# Patient Record
Sex: MALE | Race: WHITE | NOT HISPANIC OR LATINO | Employment: FULL TIME | URBAN - NONMETROPOLITAN AREA
[De-identification: names, ages, dates, MRNs, and addresses within clinical notes are randomized per-mention and may not be internally consistent; named-entity substitution may affect disease eponyms.]

---

## 2017-06-08 ENCOUNTER — AMBULATORY - GICH (OUTPATIENT)
Dept: LAB | Facility: OTHER | Age: 37
End: 2017-06-08

## 2017-06-08 DIAGNOSIS — Z51.81 ENCOUNTER FOR THERAPEUTIC DRUG LEVEL MONITORING: ICD-10-CM

## 2017-06-08 LAB
A/G RATIO - HISTORICAL: 1.7 (ref 1–2)
ALBUMIN SERPL-MCNC: 4.5 G/DL (ref 3.5–5.7)
ALP SERPL-CCNC: 64 IU/L (ref 34–104)
ALT (SGPT) - HISTORICAL: 38 IU/L (ref 7–52)
AST SERPL-CCNC: 25 IU/L (ref 13–39)
BILIRUB SERPL-MCNC: 0.4 MG/DL (ref 0.3–1)
BILIRUBIN, DIRECT: 0.04 MG/DL (ref 0.03–0.18)
BILIRUBIN,INDIRECT: 0.4 MG/DL (ref 0.2–0.8)
ERYTHROCYTE [DISTWIDTH] IN BLOOD BY AUTOMATED COUNT: 12.3 % (ref 11.5–15.5)
GLOBULIN - HISTORICAL: 2.6 G/DL (ref 2–3.7)
HCT VFR BLD AUTO: 43.5 % (ref 37–53)
HEMOGLOBIN: 16.3 G/DL (ref 13.5–17.5)
MCH RBC QN AUTO: 33.7 PG (ref 26–34)
MCHC RBC AUTO-ENTMCNC: 37.5 G/DL (ref 32–36)
MCV RBC AUTO: 90 FL (ref 80–100)
PLATELET # BLD AUTO: 240 THOU/CU MM (ref 140–440)
PMV BLD: 10.3 FL (ref 6.5–11)
PROT SERPL-MCNC: 7.1 G/DL (ref 6.4–8.9)
RED BLOOD COUNT - HISTORICAL: 4.83 MIL/CU MM (ref 4.3–5.9)
WHITE BLOOD COUNT - HISTORICAL: 8.7 THOU/CU MM (ref 4.5–11)

## 2017-07-07 ENCOUNTER — AMBULATORY - GICH (OUTPATIENT)
Dept: LAB | Facility: OTHER | Age: 37
End: 2017-07-07

## 2017-07-07 DIAGNOSIS — Z51.81 ENCOUNTER FOR THERAPEUTIC DRUG LEVEL MONITORING: ICD-10-CM

## 2017-07-07 LAB
A/G RATIO - HISTORICAL: 2 (ref 1–2)
ALBUMIN SERPL-MCNC: 4.6 G/DL (ref 3.5–5.7)
ALP SERPL-CCNC: 66 IU/L (ref 34–104)
ALT (SGPT) - HISTORICAL: 40 IU/L (ref 7–52)
AST SERPL-CCNC: 25 IU/L (ref 13–39)
BILIRUB SERPL-MCNC: 0.4 MG/DL (ref 0.3–1)
BILIRUBIN, DIRECT: 0.09 MG/DL (ref 0.03–0.18)
BILIRUBIN,INDIRECT: 0.3 MG/DL (ref 0.2–0.8)
ERYTHROCYTE [DISTWIDTH] IN BLOOD BY AUTOMATED COUNT: 12.7 % (ref 11.5–15.5)
GLOBULIN - HISTORICAL: 2.3 G/DL (ref 2–3.7)
HCT VFR BLD AUTO: 38.8 % (ref 37–53)
HEMOGLOBIN: 14 G/DL (ref 13.5–17.5)
MCH RBC QN AUTO: 32.5 PG (ref 26–34)
MCHC RBC AUTO-ENTMCNC: 36.1 G/DL (ref 32–36)
MCV RBC AUTO: 90 FL (ref 80–100)
PLATELET # BLD AUTO: 207 THOU/CU MM (ref 140–440)
PMV BLD: 10.7 FL (ref 6.5–11)
PROT SERPL-MCNC: 6.9 G/DL (ref 6.4–8.9)
RED BLOOD COUNT - HISTORICAL: 4.31 MIL/CU MM (ref 4.3–5.9)
WHITE BLOOD COUNT - HISTORICAL: 7.9 THOU/CU MM (ref 4.5–11)

## 2017-11-24 ENCOUNTER — AMBULATORY - GICH (OUTPATIENT)
Dept: RADIOLOGY | Facility: OTHER | Age: 37
End: 2017-11-24

## 2017-11-24 DIAGNOSIS — M79.672 PAIN OF LEFT FOOT: ICD-10-CM

## 2017-11-29 ENCOUNTER — HOSPITAL ENCOUNTER (OUTPATIENT)
Dept: RADIOLOGY | Facility: OTHER | Age: 37
End: 2017-11-29

## 2017-11-29 DIAGNOSIS — M79.672 PAIN OF LEFT FOOT: ICD-10-CM

## 2017-12-28 NOTE — PROGRESS NOTES
Patient Information     Patient Name MRN Sex Ulises Doll [id number] Male 1980      Progress Notes by Annalisa Murphy at 2017  6:07 PM     Author:  Annalisa Murphy Service:  (none) Author Type:  Other Clinical Staff     Filed:  2017  6:07 PM Date of Service:  2017  6:07 PM Status:  Signed     :  Annalisa Murphy (Other Clinical Staff)            Falls Risk Criteria:    Age 65 and older or under age 4        Sensory deficits    Poor vision    Use of ambulatory aides    Impaired judgment    Unable to walk independently    Meets High Risk criteria for falls:  no

## 2018-01-04 ENCOUNTER — AMBULATORY - GICH (OUTPATIENT)
Dept: LAB | Facility: OTHER | Age: 38
End: 2018-01-04

## 2018-01-04 DIAGNOSIS — Z79.899 OTHER LONG TERM (CURRENT) DRUG THERAPY: ICD-10-CM

## 2018-01-15 LAB
6-MONOACETYL MORPHINE - HISTORICAL: NORMAL NG/ML
AMPHETAMINE URINE - HISTORICAL: NORMAL NG/ML
BARBITURATE URINE - HISTORICAL: NORMAL NG/ML
BENZODIAZEPINE URINE - HISTORICAL: NORMAL NG/ML
BUPRENORPHRINE URINE - HISTORICAL: NORMAL NG/ML
COCAINE METAB URINE - HISTORICAL: NORMAL NG/ML
CREAT UR - HISTORICAL: 78 MG/DL
ETHYLGLUCURONIDE URINE - HISTORICAL: NORMAL NG/ML
FENTANYL URINE - HISTORICAL: NORMAL NG/ML
MASS SPECTROMETRY URINE - HISTORICAL: NORMAL
METHADONE URINE - HISTORICAL: NORMAL NG/ML
OPIATES URINE - HISTORICAL: NORMAL NG/ML
OXYCODONE URINE - HISTORICAL: NORMAL NG/ML
PH URINE - HISTORICAL: 5.4
PROPOXYPHENE URINE - HISTORICAL: NORMAL NG/ML
THC 50 URINE - HISTORICAL: NORMAL NG/ML
TRAMADOL - HISTORICAL: NORMAL NG/ML

## 2018-02-01 ENCOUNTER — DOCUMENTATION ONLY (OUTPATIENT)
Dept: FAMILY MEDICINE | Facility: OTHER | Age: 38
End: 2018-02-01

## 2018-02-01 RX ORDER — HYDROCODONE BITARTRATE AND ACETAMINOPHEN 7.5; 325 MG/1; MG/1
1 TABLET ORAL EVERY 4 HOURS PRN
COMMUNITY
Start: 2016-01-29 | End: 2018-02-13

## 2018-02-01 RX ORDER — PREDNISONE 20 MG/1
20 TABLET ORAL
COMMUNITY
Start: 2016-12-23 | End: 2018-02-13

## 2018-02-01 RX ORDER — PREGABALIN 100 MG/1
100 CAPSULE ORAL 3 TIMES DAILY
COMMUNITY
Start: 2016-11-03 | End: 2018-05-17

## 2018-02-09 ENCOUNTER — DOCUMENTATION ONLY (OUTPATIENT)
Dept: FAMILY MEDICINE | Facility: OTHER | Age: 38
End: 2018-02-09

## 2018-02-12 ENCOUNTER — DOCUMENTATION ONLY (OUTPATIENT)
Dept: FAMILY MEDICINE | Facility: OTHER | Age: 38
End: 2018-02-12

## 2018-02-13 ENCOUNTER — OFFICE VISIT (OUTPATIENT)
Dept: FAMILY MEDICINE | Facility: OTHER | Age: 38
End: 2018-02-13
Attending: FAMILY MEDICINE
Payer: COMMERCIAL

## 2018-02-13 VITALS
DIASTOLIC BLOOD PRESSURE: 78 MMHG | SYSTOLIC BLOOD PRESSURE: 110 MMHG | HEART RATE: 84 BPM | HEIGHT: 69 IN | WEIGHT: 195 LBS | BODY MASS INDEX: 28.88 KG/M2

## 2018-02-13 DIAGNOSIS — Z00.00 ROUTINE GENERAL MEDICAL EXAMINATION AT A HEALTH CARE FACILITY: Primary | ICD-10-CM

## 2018-02-13 DIAGNOSIS — E78.1 HYPERTRIGLYCERIDEMIA: ICD-10-CM

## 2018-02-13 DIAGNOSIS — M79.2 PERIPHERAL NEURALGIA: ICD-10-CM

## 2018-02-13 DIAGNOSIS — F17.200 TOBACCO DEPENDENCY: ICD-10-CM

## 2018-02-13 DIAGNOSIS — Z23 NEED FOR TDAP VACCINATION: ICD-10-CM

## 2018-02-13 LAB
ALBUMIN SERPL-MCNC: 4.6 G/DL (ref 3.5–5.7)
ALP SERPL-CCNC: 67 U/L (ref 34–104)
ALT SERPL W P-5'-P-CCNC: 35 U/L (ref 7–52)
ANION GAP SERPL CALCULATED.3IONS-SCNC: 10 MMOL/L (ref 3–14)
AST SERPL W P-5'-P-CCNC: 21 U/L (ref 13–39)
BILIRUB SERPL-MCNC: 0.4 MG/DL (ref 0.3–1)
BUN SERPL-MCNC: 9 MG/DL (ref 7–25)
CALCIUM SERPL-MCNC: 9.3 MG/DL (ref 8.6–10.3)
CHLORIDE SERPL-SCNC: 106 MMOL/L (ref 98–107)
CHOLEST SERPL-MCNC: 168 MG/DL
CO2 SERPL-SCNC: 22 MMOL/L (ref 21–31)
CREAT SERPL-MCNC: 0.96 MG/DL (ref 0.7–1.3)
GFR SERPL CREATININE-BSD FRML MDRD: 87 ML/MIN/1.7M2
GLUCOSE SERPL-MCNC: 97 MG/DL (ref 70–105)
HDLC SERPL-MCNC: 25 MG/DL
LDLC SERPL CALC-MCNC: ABNORMAL MG/DL
LDLC SERPL DIRECT ASSAY-MCNC: 85 MG/DL
NONHDLC SERPL-MCNC: 143 MG/DL
POTASSIUM SERPL-SCNC: 4.1 MMOL/L (ref 3.5–5.1)
PROT SERPL-MCNC: 7.2 G/DL (ref 6.4–8.9)
SODIUM SERPL-SCNC: 138 MMOL/L (ref 134–144)
TRIGL SERPL-MCNC: 457 MG/DL

## 2018-02-13 PROCEDURE — 36415 COLL VENOUS BLD VENIPUNCTURE: CPT | Performed by: FAMILY MEDICINE

## 2018-02-13 PROCEDURE — 80061 LIPID PANEL: CPT | Performed by: FAMILY MEDICINE

## 2018-02-13 PROCEDURE — 90715 TDAP VACCINE 7 YRS/> IM: CPT | Performed by: FAMILY MEDICINE

## 2018-02-13 PROCEDURE — 90471 IMMUNIZATION ADMIN: CPT | Performed by: FAMILY MEDICINE

## 2018-02-13 PROCEDURE — 99214 OFFICE O/P EST MOD 30 MIN: CPT | Mod: 25 | Performed by: FAMILY MEDICINE

## 2018-02-13 PROCEDURE — 80053 COMPREHEN METABOLIC PANEL: CPT | Performed by: FAMILY MEDICINE

## 2018-02-13 PROCEDURE — 83721 ASSAY OF BLOOD LIPOPROTEIN: CPT | Mod: 59 | Performed by: FAMILY MEDICINE

## 2018-02-13 RX ORDER — HYDROCODONE BITARTRATE AND ACETAMINOPHEN 5; 325 MG/1; MG/1
2 TABLET ORAL
Qty: 18 TABLET | Refills: 0 | COMMUNITY
Start: 2018-02-13 | End: 2018-07-19

## 2018-02-13 RX ORDER — TRAZODONE HYDROCHLORIDE 50 MG/1
100 TABLET, FILM COATED ORAL
Qty: 90 TABLET | COMMUNITY
Start: 2018-02-13 | End: 2021-10-03

## 2018-02-13 ASSESSMENT — ENCOUNTER SYMPTOMS
SORE THROAT: 0
NAUSEA: 0
HEADACHES: 0
DIARRHEA: 0
APPETITE CHANGE: 0
WHEEZING: 0
HEMATOCHEZIA: 0
FATIGUE: 0
CONSTIPATION: 0
CHEST TIGHTNESS: 0
HEARTBURN: 1
ACTIVITY CHANGE: 0
SLEEP DISTURBANCE: 0
SHORTNESS OF BREATH: 0
PALPITATIONS: 0

## 2018-02-13 ASSESSMENT — PAIN SCALES - GENERAL: PAINLEVEL: MODERATE PAIN (5)

## 2018-02-13 NOTE — PATIENT INSTRUCTIONS
check cholesterol today, liver and kidney function  tdap updated today  Continue current medications  How to Quit Smoking  Smoking is one of the hardest habits to break. About half of all people who have ever smoked have been able to quit. Most people who still smoke want to quit. Here are some of the best ways to stop smoking.    Keep trying  Most smokers make many attempts at quitting before they are successful. It s important not to give up.  Go cold turkey  Most former smokers quit cold turkey (all at once). Trying to cut back gradually doesn't seem to work as well, perhaps because it continues the smoking habit. Also, it is possible to inhale more while smoking fewer cigarettes. This results in the same amount of nicotine in your body.  Get support  Support programs can be a big help, especially for heavy smokers. These groups offer lectures, ways to change behavior, and peer support. Here are some ways to find a support program:    Free national quitline: 800-QUIT-NOW (757-128-7233).    [facility] quit-smoking programs.    American Lung Association: (137.106.5940).    American Cancer Society (721-552-1875).  Support at home is important too. Nonsmokers can offer praise and encouragement. If the smoker in your life finds it hard to quit, encourage them to keep trying.  Over-the-counter medicines  Nicotine replacement therapy may make quitting easier. Certain aids, such as the nicotine patch, gum, and lozenges, are available without a prescription. It is best to use these under a doctor s care, though. The skin patch provides a steady supply of nicotine. Nicotine gum and lozenges give temporary bursts of low levels of nicotine. Both methods reduce the craving for cigarettes. Warning: If you have nausea, vomiting, dizziness, weakness, or a fast heartbeat, stop using these products and see your doctor.  Prescription medicines  After reviewing your smoking patterns and past attempts to quit, your doctor may offer a  "prescription medicine such as bupropion, varenicline, a nicotine inhaler, or nasal spray. Each has advantages and side effects. Your doctor can review these with you.  Health benefits of quitting  The benefits of quitting start right away and keep improving the longer you go without smoking. These benefits occur at any age.  So whether you are 17 or 70, quitting is a good decision. Some of the benefits include:    20 minutes: Blood pressure and pulse return to normal.    8 hours: Oxygen levels return to normal.    2 days: Ability to smell and taste begin to improve as damaged nerves regrow.    2 to 3 weeks: Circulation and lung function improve.    1 to 9 months: Coughing, congestion, and shortness of breath decrease; tiredness decreases.    1 year: Risk of heart attack decreases by half.    5 years: Risk of lung cancer decreases by half; risk of stroke becomes the same as a nonsmoker s.  For more on how to quit smoking, try these online resources:     [place].gov    \"Clearing the Air\" booklet from the [facility] [place]: smokefree.gov/sites/default/files/pdf/clearing-the-air-accessible.pdf  Date Last Reviewed: 3/1/2017    6403-6813 [employer]. [address] [place] [address]. All rights reserved. This information is not intended as a substitute for professional medical care. Always follow your healthcare professional's instructions.        Coping with Smoking Withdrawal  For the first few days after you quit smoking, you may feel cranky, restless, depressed, or low on energy. These are symptoms of withdrawal. Your body needs time to recover from smoking. Your symptoms should lessen within a few days.    Coping with the urge to smoke    Deep-breathe. Inhale through your nose. Count to 5. Slowly exhale through your mouth.    Drink water. Try to drink 8 or more 8-ounce glasses of water a day.    Keep your hands busy. Wash your car. Draw. Do a puzzle. Build a birdhouse.    Delay. The " urge to smoke lasts only 3 to 5 minutes.  Get support  Individual, group, and telephone counseling can help keep you on track. Ask your healthcare provider for more information about resources available to you.  Control stress  After you quit, you may feel irritable and stressed. Try taking a warm bath or shower. Listen to music. Go for a walk or to the gym. Try yoga or meditate. Call friends or talk with a professional.  Exercise  Exercise helps your body and mind feel better. There are many ways to be more active. Find something you enjoy doing. See if a friend will join you for a walk or a bike ride.  Sleep better  You may feel tired but have trouble falling asleep. Try to relax before bed. Do a few stretching exercises. Read for a while. Also, avoid caffeine for at least a few hours before bedtime.  Get fit, not fat  You may notice an increased appetite. Many people who quit smoking gain a few pounds. To limit weight gain, try to watch what you eat. Cut back on fat in your diet. Snack on low-calorie foods, like fresh fruits and vegetables. Drink low-calorie liquids, especially water. Regular exercise can also help you stay fit. And remember: Your main goal is to be a nonsmoker. Stay focused on that goal.  Quit-smoking products  There are a number of products that can help you quit smoking including medicines and nicotine replacement products. They are available over-the-counter or by prescription. Ask your healthcare provider if any of these could help you quit smoking.        For more information    https://smokefree.gov/ncvp-zv-ge-expert    National Cancer [place] Smoking Quitline: 877-44U-QUIT (229-577-8248)   Date Last Reviewed: 2/1/2017 2000-2017 [employer]. [address] [place] [address], [place] [address]. All rights reserved. This information is not intended as a substitute for professional medical care. Always follow your healthcare professional's instructions.

## 2018-02-13 NOTE — NURSING NOTE
Patient is here for annual physical. Patient has a wart on right thumb and a lump on top lip would like checked.  Kamla Goodman LPN .............2/13/20188:20 AM

## 2018-02-13 NOTE — MR AVS SNAPSHOT
After Visit Summary   2/13/2018    Ulises Greene    MRN: [id number]           Patient Information     Date Of Birth          1980        Visit Information        Provider Department      2/13/2018 8:15 AM Elizabeth Corrigan MD [facility]        Today's Diagnoses     Routine general medical examination at a health care facility    -  1    Tobacco dependency        Peripheral neuralgia        Hypertriglyceridemia        Need for Tdap vaccination          Care Instructions    check cholesterol today, liver and kidney function  tdap updated today  Continue current medications  How to Quit Smoking  Smoking is one of the hardest habits to break. About half of all people who have ever smoked have been able to quit. Most people who still smoke want to quit. Here are some of the best ways to stop smoking.    Keep trying  Most smokers make many attempts at quitting before they are successful. It s important not to give up.  Go cold turkey  Most former smokers quit cold turkey (all at once). Trying to cut back gradually doesn't seem to work as well, perhaps because it continues the smoking habit. Also, it is possible to inhale more while smoking fewer cigarettes. This results in the same amount of nicotine in your body.  Get support  Support programs can be a big help, especially for heavy smokers. These groups offer lectures, ways to change behavior, and peer support. Here are some ways to find a support program:    Free national quitline: 800-QUIT-NOW (727-814-8052).    [facility] quit-smoking programs.    American Lung Association: (370.697.8865).    American Cancer Society (990-465-2549).  Support at home is important too. Nonsmokers can offer praise and encouragement. If the smoker in your life finds it hard to quit, encourage them to keep trying.  Over-the-counter medicines  Nicotine replacement therapy may make quitting easier. Certain aids, such as the nicotine patch,  "gum, and lozenges, are available without a prescription. It is best to use these under a doctor s care, though. The skin patch provides a steady supply of nicotine. Nicotine gum and lozenges give temporary bursts of low levels of nicotine. Both methods reduce the craving for cigarettes. Warning: If you have nausea, vomiting, dizziness, weakness, or a fast heartbeat, stop using these products and see your doctor.  Prescription medicines  After reviewing your smoking patterns and past attempts to quit, your doctor may offer a prescription medicine such as bupropion, varenicline, a nicotine inhaler, or nasal spray. Each has advantages and side effects. Your doctor can review these with you.  Health benefits of quitting  The benefits of quitting start right away and keep improving the longer you go without smoking. These benefits occur at any age.  So whether you are 17 or 70, quitting is a good decision. Some of the benefits include:    20 minutes: Blood pressure and pulse return to normal.    8 hours: Oxygen levels return to normal.    2 days: Ability to smell and taste begin to improve as damaged nerves regrow.    2 to 3 weeks: Circulation and lung function improve.    1 to 9 months: Coughing, congestion, and shortness of breath decrease; tiredness decreases.    1 year: Risk of heart attack decreases by half.    5 years: Risk of lung cancer decreases by half; risk of stroke becomes the same as a nonsmoker s.  For more on how to quit smoking, try these online resources:     Smokefree.gov    \"Clearing the Air\" booklet from the National Cancer [place]: smokefree.gov/sites/default/files/pdf/clearing-the-air-accessible.pdf  Date Last Reviewed: 3/1/2017    7939-3890 The "[employer]. [address] [place] [address]. All rights reserved. This information is not intended as a substitute for professional medical care. Always follow your healthcare professional's instructions.        Coping with Smoking " Withdrawal  For the first few days after you quit smoking, you may feel cranky, restless, depressed, or low on energy. These are symptoms of withdrawal. Your body needs time to recover from smoking. Your symptoms should lessen within a few days.    Coping with the urge to smoke    Deep-breathe. Inhale through your nose. Count to 5. Slowly exhale through your mouth.    Drink water. Try to drink 8 or more 8-ounce glasses of water a day.    Keep your hands busy. Wash your car. Draw. Do a puzzle. Build a [employer].    Delay. The urge to smoke lasts only 3 to 5 minutes.  Get support  Individual, group, and telephone counseling can help keep you on track. Ask your healthcare provider for more information about resources available to you.  Control stress  After you quit, you may feel irritable and stressed. Try taking a warm bath or shower. Listen to music. Go for a walk or to the gym. Try yoga or meditate. Call friends or talk with a professional.  Exercise  Exercise helps your body and mind feel better. There are many ways to be more active. Find something you enjoy doing. See if a friend will join you for a walk or a bike ride.  Sleep better  You may feel tired but have trouble falling asleep. Try to relax before bed. Do a few stretching exercises. Read for a while. Also, avoid caffeine for at least a few hours before bedtime.  Get fit, not fat  You may notice an increased appetite. Many people who quit smoking gain a few pounds. To limit weight gain, try to watch what you eat. Cut back on fat in your diet. Snack on low-calorie foods, like fresh fruits and vegetables. Drink low-calorie liquids, especially water. Regular exercise can also help you stay fit. And remember: Your main goal is to be a nonsmoker. Stay focused on that goal.  Quit-smoking products  There are a number of products that can help you quit smoking including medicines and nicotine replacement products. They are available over-the-counter or by  "prescription. Ask your healthcare provider if any of these could help you quit smoking.        For more information    https://smokefree.gov/qpom-gl-mj-expert    National Cancer [place] Smoking Quitline: 877-44U-QUIT (016-448-9876)   Date Last Reviewed: 2/1/2017 2000-2017 [employer]. [address] [place] [address]. All rights reserved. This information is not intended as a substitute for professional medical care. Always follow your healthcare professional's instructions.                Follow-ups after your visit        Future tests that were ordered for you today     Open Future Orders        Priority Expected Expires Ordered    Lipid panel reflex to direct LDL Fasting Routine 2/13/2018 2/27/2018 2/13/2018            Who to contact     If you have questions or need follow up information about today's clinic visit or your schedule please contact [facility] AND [facility] directly at [phone].  Normal or non-critical lab and imaging results will be communicated to you by MyChart, letter or phone within 4 business days after the clinic has received the results. If you do not hear from us within 7 days, please contact the clinic through MyChart or phone. If you have a critical or abnormal lab result, we will notify you by phone as soon as possible.  Submit refill requests through [employer] or call your pharmacy and they will forward the refill request to us. Please allow 3 business days for your refill to be completed.          Additional Information About Your Visit        Care EveryWhere ID     This is your Care EveryWhere ID. This could be used by other organizations to access your [facility] medical records  [id number]        Your Vitals Were     Pulse Height BMI (Body Mass Index)             84 5' 9\" (1.753 m) 28.8 kg/m2          Blood Pressure from Last 3 Encounters:   02/13/18 110/78   12/23/16 128/82   11/30/16 104/62    Weight from Last 3 Encounters:   02/13/18 195 lb " (88.5 kg)   12/23/16 193 lb (87.5 kg)   11/30/16 190 lb (86.2 kg)              We Performed the Following     Comprehensive metabolic panel (BMP + Alb, Alk Phos, ALT, AST, Total. Bili, TP)     TDAP VACCINE (ADACEL)          Today's Medication Changes          These changes are accurate as of 2/13/18  8:52 AM.  If you have any questions, ask your nurse or doctor.               These medicines have changed or have updated prescriptions.        Dose/Directions    HYDROcodone-acetaminophen 5-325 MG per tablet   Commonly known as:  NORCO   This may have changed:  Another medication with the same name was removed. Continue taking this medication, and follow the directions you see here.   Changed by:  Elizabeth Corrigan MD        Dose:  2 tablet   Take 2 tablets by mouth three times a week maximum  tablet(s) per day   Quantity:  18 tablet   Refills:  0       LYRICA 100 MG capsule   This may have changed:  Another medication with the same name was removed. Continue taking this medication, and follow the directions you see here.   Generic drug:  pregabalin   Changed by:  Elizabeth Corrigan MD        Dose:  100 mg   Take 100 mg by mouth 3 times daily   Refills:  0         Stop taking these medicines if you haven't already. Please contact your care team if you have questions.     aspirin  MG EC tablet   Stopped by:  Elizabeth Corrigan MD           HYDROmorphone 2 MG tablet   Commonly known as:  DILAUDID   Stopped by:  Elizabeth Corrigan MD           ibuprofen 600 MG tablet   Commonly known as:  ADVIL/MOTRIN   Stopped by:  Elizabeth Corrigan MD           ketorolac 10 MG tablet   Commonly known as:  TORADOL   Stopped by:  Elizabeth Corrigan MD           PAMELOR PO   Stopped by:  Elizabeth Corrigan MD           predniSONE 20 MG tablet   Commonly known as:  DELTASONE   Stopped by:  Elizabeth Corrigan MD           senna-docusate  8.6-50 MG per tablet   Commonly known as:  SENOKOT-S;PERICOLACE   Stopped by:  Elizabeth Corrigan MD           ZANAFLEX PO   Stopped by:  Elizabeth Corrigan MD                    Primary Care Provider Office Phone # Fax #    Daren Carranza [phone]       [address] [facility] [address]        Equal Access to Services     : Hadii aad ku hadasho Soomaali, waaxda luqadaha, qaybta kaalmada adeegyada, waxay idiin hayaan adeeg kharash larochelle . So [facility] [phone].    ATENCIÓN: Si jenniferla esplexi, tiene a mckeon disposición servicios gratuitos de asistencia lingüística. Llame al [phone].    We comply with applicable federal civil rights laws and [place] laws. We do not discriminate on the basis of race, color, national origin, age, disability, sex, sexual orientation, or gender identity.            Thank you!     Thank you for choosing [facility] AND [facility]  for your care. Our goal is always to provide you with excellent care. Hearing back from our patients is one way we can continue to improve our services. Please take a few minutes to complete the written survey that you may receive in the mail after your visit with us. Thank you!             Your Updated Medication List - Protect others around you: Learn how to safely use, store and throw away your medicines at www.disposemymeds.org.          This list is accurate as of 2/13/18  8:52 AM.  Always use your most recent med list.                   Brand Name Dispense Instructions for use Diagnosis    HYDROcodone-acetaminophen 5-325 MG per tablet    NORCO    18 tablet    Take 2 tablets by mouth three times a week maximum  tablet(s) per day        LYRICA 100 MG capsule   Generic drug:  pregabalin      Take 100 mg by mouth 3 times daily        traZODone 50 MG tablet    DESYREL    90 tablet    Take 1 tablet (50 mg) by mouth At Bedtime

## 2018-02-13 NOTE — LETTER
February 13, 2018      Ulises Greene  [address]  [place] [address]        Dear ,    We are writing to inform you of your test results.    Your test results fall within the expected range(s) or remain unchanged from previous results.  Please continue with current treatment plan.    Resulted Orders   Comprehensive metabolic panel (BMP + Alb, Alk Phos, ALT, AST, Total. Bili, TP)   Result Value Ref Range    Sodium 138 134 - 144 mmol/L    Potassium 4.1 3.5 - 5.1 mmol/L    Chloride 106 98 - 107 mmol/L    Carbon Dioxide 22 21 - 31 mmol/L    Anion Gap 10 3 - 14 mmol/L    Glucose 97 70 - 105 mg/dL    Urea Nitrogen 9 7 - 25 mg/dL    Creatinine 0.96 0.70 - 1.30 mg/dL    GFR Estimate 87 >60 mL/min/1.7m2    GFR Estimate If Black >90 >60 mL/min/1.7m2    Calcium 9.3 8.6 - 10.3 mg/dL    Bilirubin Total 0.4 0.3 - 1.0 mg/dL    Albumin 4.6 3.5 - 5.7 g/dL    Protein Total 7.2 6.4 - 8.9 g/dL    Alkaline Phosphatase 67 34 - 104 U/L    ALT 35 7 - 52 U/L    AST 21 13 - 39 U/L       If you have any questions or concerns, please call the clinic at the number listed above.       Sincerely,  MD Jason Doshi MD

## 2018-02-13 NOTE — PROGRESS NOTES
SUBJECTIVE:   Ulises Greene is a 37 year old male who presents to clinic today for the following health issues:      1) chronic right foot pain pain has been managed by secondary to crush injury at work.  This has left him with chronic neuropathic pain.  Pain has been managed by the chronic pain clinic in [place].  He is currently on Lyrica and intermittent hydrocodone.  He was also recently started on trazodone at bedtime.  He will continue getting refills of these medications through the pain clinic.    2.  He has struggled with weight gain over the past 5-8 years.  He feels this is related to decreased activity from the foot injury.  He typically does not eat until 2 or 3 PM in the afternoon.  He gets minimal exercise.  3.  He smokes three quarters of pack cigarettes per day.  His wife also smokes.  He recently signed up for smoking cessation program through work.    Problem list and histories reviewed & adjusted, as indicated.  Additional history: as documented    Current Outpatient Prescriptions   Medication Sig Dispense Refill     traZODone (DESYREL) 50 MG tablet Take 1 tablet (50 mg) by mouth At Bedtime 90 tablet      HYDROcodone-acetaminophen (NORCO) 5-325 MG per tablet Take 2 tablets by mouth three times a week maximum  tablet(s) per day 18 tablet 0     pregabalin (LYRICA) 100 MG capsule Take 100 mg by mouth 3 times daily       Allergies   Allergen Reactions     Gabapentin      Other reaction(s): Tremors     Methadone      Other reaction(s): Sleep Disturbances       Reviewed and updated as needed this visit by clinical staff  Tobacco  Allergies  Meds  Med Hx  Surg Hx  Fam Hx  Soc Hx      Reviewed and updated as needed this visit by Provider         Review of Systems   Constitutional: Negative for activity change, appetite change and fatigue.   HENT: Negative for congestion and sore throat.    Respiratory: Negative for chest tightness, shortness of breath and wheezing.    Cardiovascular: Negative  "for chest pain, palpitations and peripheral edema.   Gastrointestinal: Positive for heartburn. Negative for constipation, diarrhea, hematochezia and nausea.   Musculoskeletal:        Left foot pain   Skin: Negative for rash.   Neurological: Negative for headaches.   Psychiatric/Behavioral: Negative for mood changes and sleep disturbance.        OBJECTIVE:     /78 (BP Location: Right arm, Patient Position: Sitting, Cuff Size: Adult Large)  Pulse 84  Ht 5' 9\" (1.753 m)  Wt 195 lb (88.5 kg)  BMI 28.8 kg/m2  Body mass index is 28.8 kg/(m^2).  Physical Exam   Constitutional: He appears well-developed.   HENT:   Right Ear: External ear normal.   Left Ear: External ear normal.   Mouth/Throat: Oropharynx is clear and moist.   Neck: No thyromegaly present.   Cardiovascular: Normal rate and regular rhythm.    Pulmonary/Chest: Effort normal and breath sounds normal. No respiratory distress.   Abdominal: Soft. Bowel sounds are normal.   Musculoskeletal: He exhibits no edema.   Lymphadenopathy:     He has no cervical adenopathy.   Neurological: He is alert. He has normal reflexes.   Psychiatric: He has a normal mood and affect.        Diagnostic Test Results:  Results for orders placed or performed in visit on 02/13/18 (from the past 24 hour(s))   Comprehensive metabolic panel (BMP + Alb, Alk Phos, ALT, AST, Total. Bili, TP)   Result Value Ref Range    Sodium 138 134 - 144 mmol/L    Potassium 4.1 3.5 - 5.1 mmol/L    Chloride 106 98 - 107 mmol/L    Carbon Dioxide 22 21 - 31 mmol/L    Anion Gap 10 3 - 14 mmol/L    Glucose 97 70 - 105 mg/dL    Urea Nitrogen 9 7 - 25 mg/dL    Creatinine 0.96 0.70 - 1.30 mg/dL    GFR Estimate 87 >60 mL/min/1.7m2    GFR Estimate If Black >90 >60 mL/min/1.7m2    Calcium 9.3 8.6 - 10.3 mg/dL    Bilirubin Total 0.4 0.3 - 1.0 mg/dL    Albumin 4.6 3.5 - 5.7 g/dL    Protein Total 7.2 6.4 - 8.9 g/dL    Alkaline Phosphatase 67 34 - 104 U/L    ALT 35 7 - 52 U/L    AST 21 13 - 39 U/L "       ASSESSMENT/PLAN:             ICD-10-CM    1. Routine general medical examination at a health care facility Z00.00    2. Tobacco dependency F17.200    3. Peripheral neuralgia M79.2    4. Hypertriglyceridemia E78.1 Lipid panel reflex to direct LDL Fasting     Comprehensive metabolic panel (BMP + Alb, Alk Phos, ALT, AST, Total. Bili, TP)     Lipid panel reflex to direct LDL Fasting   5. Need for Tdap vaccination Z23 Tdap (tetanus-diptheria-acell pertussis) (BOOSTRIX) injection 0.5 mL     TDAP VACCINE (BOOSTRIX)     CANCELED: TDAP VACCINE (ADACEL)   reviewed preventive cares, lipid panel pending  Reviewed importance of smoking cessation, he has tried Chantix in the past but did not tolerate it due to side effects.  He is now involved with smoking  cessation program at work.  His main barrier to quitting is a spouse who also smokes.  He understands the health benefits.    Reviewed the importance of breakfast every day.  Discussed low-carb high-protein diet.  Avoidance of sugary beverages and snack and evening hours.  He can expect to gain some weight if he quit smoking.    He declined influenza vaccine.  Updated Tdap today.    Continue following with pain clinic in regards to chronic left foot pain.  This is a Workmen's Comp. case.    Patient Instructions     check cholesterol today, liver and kidney function  tdap updated today  Continue current medications    See Patient Instructions  I spent over 25 minutes with the patient, greater than 50% was spent in counseling and coordination of care.    Elizabeth Flores MD

## 2018-03-21 PROBLEM — G57.72 COMPLEX REGIONAL PAIN SYNDROME TYPE 2 OF LEFT LOWER EXTREMITY: Status: ACTIVE | Noted: 2017-10-24

## 2018-03-21 RX ORDER — PREGABALIN 200 MG/1
1 CAPSULE ORAL 3 TIMES DAILY
COMMUNITY
Start: 2018-02-13 | End: 2024-02-22

## 2018-03-22 ENCOUNTER — OFFICE VISIT (OUTPATIENT)
Dept: FAMILY MEDICINE | Facility: OTHER | Age: 38
End: 2018-03-22
Attending: FAMILY MEDICINE
Payer: COMMERCIAL

## 2018-03-22 VITALS
WEIGHT: 198 LBS | BODY MASS INDEX: 29.24 KG/M2 | DIASTOLIC BLOOD PRESSURE: 67 MMHG | SYSTOLIC BLOOD PRESSURE: 99 MMHG | HEART RATE: 68 BPM

## 2018-03-22 DIAGNOSIS — Z71.6 ENCOUNTER FOR SMOKING CESSATION COUNSELING: Primary | ICD-10-CM

## 2018-03-22 PROCEDURE — 99213 OFFICE O/P EST LOW 20 MIN: CPT | Performed by: FAMILY MEDICINE

## 2018-03-22 RX ORDER — VARENICLINE TARTRATE 1 MG/1
1 TABLET, FILM COATED ORAL 2 TIMES DAILY
Qty: 56 TABLET | Refills: 2 | Status: SHIPPED | OUTPATIENT
Start: 2018-03-22 | End: 2018-05-17

## 2018-03-22 ASSESSMENT — PAIN SCALES - GENERAL: PAINLEVEL: SEVERE PAIN (6)

## 2018-03-22 NOTE — NURSING NOTE
Patient is here to discuss smoking cessation. Smokes about 1/2-3/4 pack a day. Has not tried anything yet to help.  Kamla Goodman LPN .............3/22/308933:01 AM

## 2018-03-22 NOTE — MR AVS SNAPSHOT
After Visit Summary   3/22/2018    Ulises Greene    MRN: [id number]           Patient Information     Date Of Birth          1980        Visit Information        Provider Department      3/22/2018 10:45 AM Elizabeth Corrigan MD [facility]        Today's Diagnoses     Encounter for smoking cessation counseling    -  1       Follow-ups after your visit        Who to contact     If you have questions or need follow up information about today's clinic visit or your schedule please contact [facility] directly at [phone].  Normal or non-critical lab and imaging results will be communicated to you by MyChart, letter or phone within 4 business days after the clinic has received the results. If you do not hear from us within 7 days, please contact the clinic through MyChart or phone. If you have a critical or abnormal lab result, we will notify you by phone as soon as possible.  Submit refill requests through [employer] or call your pharmacy and they will forward the refill request to us. Please allow 3 business days for your refill to be completed.          Additional Information About Your Visit        Care EveryWhere ID     This is your Care EveryWhere ID. This could be used by other organizations to access your [facility] medical records  [id number]        Your Vitals Were     Pulse BMI (Body Mass Index)                68 29.24 kg/m2           Blood Pressure from Last 3 Encounters:   03/22/18 99/67   02/13/18 110/78   12/23/16 128/82    Weight from Last 3 Encounters:   03/22/18 198 lb (89.8 kg)   02/13/18 195 lb (88.5 kg)   12/23/16 193 lb (87.5 kg)              Today, you had the following     No orders found for display         Today's Medication Changes          These changes are accurate as of 3/22/18 11:25 AM.  If you have any questions, ask your nurse or doctor.               Start taking these medicines.        Dose/Directions     nicotine polacrilex 2 MG gum   Commonly known as:  TGT NICOTINE   Used for:  Encounter for smoking cessation counseling   Started by:  Elizabeth Corrigan MD        Dose:  2 mg   Place 1 each (2 mg) inside cheek as needed for smoking cessation   Quantity:  30 tablet   Refills:  3       * varenicline 0.5 MG X 11 & 1 MG X 42 tablet   Commonly known as:  CHANTIX STARTING MONTH NASIM   Used for:  Encounter for smoking cessation counseling   Started by:  Elizabeth Corrigan MD        Take 0.5 mg tab daily for 3 days, then 0.5 mg tab twice daily for 4 days, then 1 mg twice daily.   Quantity:  53 tablet   Refills:  0       * varenicline 1 MG tablet   Commonly known as:  CHANTIX   Used for:  Encounter for smoking cessation counseling   Started by:  Elizabeth Corrigan MD        Dose:  1 mg   Take 1 tablet (1 mg) by mouth 2 times daily   Quantity:  56 tablet   Refills:  2       * Notice:  This list has 2 medication(s) that are the same as other medications prescribed for you. Read the directions carefully, and ask your doctor or other care provider to review them with you.         Where to get your medicines      These medications were sent to [facility] - [place], [place] - [facility] [address] [facility]  [address] [facility] [address]     Phone:  [phone]     nicotine polacrilex 2 MG gum    varenicline 0.5 MG X 11 & 1 MG X 42 tablet    varenicline 1 MG tablet                Primary Care Provider Office Phone # Fax #    Daren Carranza [phone]       [address] [facility] [address]        Equal Access to Services     [facility] AH: Hadii aad ku hadasho Soomaali, waaxda luqadaha, qaybta kaalmada adeegyada, benson montemayor. So [facility] [phone].    ATENCIÓN: Si habla español, tiene a mckeon disposición servicios gratuitos de asistencia lingüística. Llame al [phone].    We comply with applicable [facility] civil  rights laws and [place] laws. We do not discriminate on the basis of race, color, national origin, age, disability, sex, sexual orientation, or gender identity.            Thank you!     Thank you for choosing [facility]  for your care. Our goal is always to provide you with excellent care. Hearing back from our patients is one way we can continue to improve our services. Please take a few minutes to complete the written survey that you may receive in the mail after your visit with us. Thank you!             Your Updated Medication List - Protect others around you: Learn how to safely use, store and throw away your medicines at www.disposemymeds.org.          This list is accurate as of 3/22/18 11:25 AM.  Always use your most recent med list.                   Brand Name Dispense Instructions for use Diagnosis    HYDROcodone-acetaminophen 5-325 MG per tablet    NORCO    18 tablet    Take 2 tablets by mouth three times a week maximum  tablet(s) per day        * LYRICA 100 MG capsule   Generic drug:  pregabalin      Take 100 mg by mouth 3 times daily        * LYRICA 200 MG capsule   Generic drug:  Pregabalin      Take 1 capsule by mouth 2 times daily        nicotine polacrilex 2 MG gum    TGT NICOTINE    30 tablet    Place 1 each (2 mg) inside cheek as needed for smoking cessation    Encounter for smoking cessation counseling       traZODone 50 MG tablet    DESYREL    90 tablet    Take 100 mg by mouth At Bedtime        * varenicline 0.5 MG X 11 & 1 MG X 42 tablet    CHANTIX STARTING MONTH NASIM    53 tablet    Take 0.5 mg tab daily for 3 days, then 0.5 mg tab twice daily for 4 days, then 1 mg twice daily.    Encounter for smoking cessation counseling       * varenicline 1 MG tablet    CHANTIX    56 tablet    Take 1 tablet (1 mg) by mouth 2 times daily    Encounter for smoking cessation counseling       * Notice:  This list has 4 medication(s) that are the same as other medications prescribed for  you. Read the directions carefully, and ask your doctor or other care provider to review them with you.

## 2018-03-22 NOTE — PROGRESS NOTES
SUBJECTIVE:   Ulises Greene is a 37 year old male who presents to clinic today for the following health issues:  Nursing Notes:   Kamla Goodman LPN  3/22/2018 11:08 AM  Signed  Patient is here to discuss smoking cessation. Smokes about 1/2-3/4 pack a day. Has not tried anything yet to help.  Kamla Goodman LPN .............3/22/078812:01 AM        HPI  37-year-old male presents to discuss smoking cessation.  He smokes half a pack to three quarters a pack per day.  He has smoked for over 20 years.  His wife recently quit with the use of Chantix.  He tried Chantix a few years ago and did not tolerate it.  He recently went through smoking cessation class at work and feels more motivated to quit.    Patient Active Problem List    Diagnosis Date Noted     Complex regional pain syndrome type 2 of left lower extremity 10/24/2017     Priority: Medium     Left foot pain 12/23/2016     Priority: Medium     Pain of right upper extremity 11/30/2016     Priority: Medium     Onychomycosis 02/01/2016     Priority: Medium     Diastasis recti 08/10/2015     Priority: Medium     Hypertriglyceridemia 06/13/2014     Priority: Medium     Spinal cord stimulator status 06/12/2014     Priority: Medium     Fracture of metatarsal bone 08/01/2013     Priority: Medium     Overview:   S/P 3 Surgery  Fused first  through fourth fusion       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     Priority: Medium     Dystonia of left foot secondary to crush injury to midfoot resulting in nerve injury       Peripheral neuralgia 06/25/2011     Priority: Medium     Tobacco dependency 06/25/2011     Priority: Medium     Past Surgical History:   Procedure Laterality Date     EXTRACTION(S) DENTAL      No Comments Provided     LAPAROSCOPIC CHOLECYSTECTOMY      6/26/14,Cholecystectomy,Laparoscopic     OTHER SURGICAL HISTORY      2008,[id number],OTHER,Left,times 3     OTHER SURGICAL HISTORY      2012,[id number],OTHER       Review of Systems     OBJECTIVE:     BP 99/67  Pulse 68   Wt 198 lb (89.8 kg)  BMI 29.24 kg/m2  Body mass index is 29.24 kg/(m^2).  Physical Exam        ASSESSMENT/PLAN:           ICD-10-CM    1. Encounter for smoking cessation counseling Z71.6 varenicline (CHANTIX STARTING MONTH NASIM) 0.5 MG X 11 & 1 MG X 42 tablet    Z72.0 varenicline (CHANTIX) 1 MG tablet     nicotine polacrilex (TGT NICOTINE) 2 MG gum     Discussed smoking cessation at length.  He is going to try Chantix again and use nicotine gum as needed.  He recently completed a smoking cessation course which is given him other coping mechanisms.  I spent over 15 minutes with the patient, greater than 50% spent in counseling and coordination of care.      Elizabeth Flores MD  [facility]

## 2018-05-17 ENCOUNTER — OFFICE VISIT (OUTPATIENT)
Dept: FAMILY MEDICINE | Facility: OTHER | Age: 38
End: 2018-05-17
Attending: FAMILY MEDICINE

## 2018-05-17 VITALS
HEART RATE: 76 BPM | DIASTOLIC BLOOD PRESSURE: 78 MMHG | HEIGHT: 69 IN | TEMPERATURE: 97.1 F | SYSTOLIC BLOOD PRESSURE: 112 MMHG | BODY MASS INDEX: 29.89 KG/M2 | WEIGHT: 201.8 LBS

## 2018-05-17 DIAGNOSIS — Z01.818 PREOP GENERAL PHYSICAL EXAM: ICD-10-CM

## 2018-05-17 DIAGNOSIS — G57.72 COMPLEX REGIONAL PAIN SYNDROME TYPE 2 OF LEFT LOWER EXTREMITY: ICD-10-CM

## 2018-05-17 DIAGNOSIS — M79.672 LEFT FOOT PAIN: ICD-10-CM

## 2018-05-17 DIAGNOSIS — Z01.818 PRE-OPERATIVE EXAMINATION: Primary | ICD-10-CM

## 2018-05-17 PROBLEM — Z87.891 FORMER SMOKER: Status: ACTIVE | Noted: 2018-05-17

## 2018-05-17 PROCEDURE — 99214 OFFICE O/P EST MOD 30 MIN: CPT | Performed by: FAMILY MEDICINE

## 2018-05-17 NOTE — PROGRESS NOTES
[facility]  [address]  [place] [address]  [phone]    PRE-OP EVALUATION:  Today's date: 2018    Ulises Greene (: 1980) presents for pre-operative evaluation assessment as requested by Dr. Ratliff.  He requires evaluation and anesthesia risk assessment prior to undergoing surgery below:  Nursing Notes:   Concetta Joya LPN  2018  4:09 PM  Addendum  Date of Surgery: 2018   Type of Surgery: Spinal Cord Stimulator Trial  Surgeon: Dr. Laurent Ratliff  Hospital:  [facility]    Concetta Neves ....................  2018   3:57 PM      1. NO - Do you have a history of heart attack, stroke, stent, bypass or surgery on an artery in the head, neck, heart or legs?  2. NO - Do you ever have any pain or discomfort in your chest?  3. NO - Do you have a history of  Heart Failure?  4. NO - Are you troubled by shortness of breath when: walking on the level, up a slight hill or at night?  5. NO - Do you currently have a cold, bronchitis or other respiratory infection?  6. NO - Do you have a cough, shortness of breath or wheezing?  7. NO - Do you sometimes get pains in the calves of your legs when you walk?  8. NO - Do you or anyone in your family have previous history of blood clots?  9. NO - Do you or does anyone in your family have a serious bleeding problem such as prolonged bleeding following surgeries or cuts?  10. NO - Have you ever had problems with anemia or been told to take iron pills?  11. NO - Have you had any abnormal blood loss such as black, tarry or bloody stools, or abnormal vaginal bleeding?  12. NO - Have you ever had a blood transfusion?  13. NO - Have you or any of your relatives ever had problems with anesthesia?  14. NO - Do you have sleep apnea, excessive snoring or daytime drowsiness?  15. NO - Do you have any prosthetic heart valves?  16. NO - Do you have prosthetic joints?  17. NO - Is there  any chance that you may be pregnant?      HPI:     HPI related to upcoming procedure:   This patient sustained a work-related left foot injury in 2008 with subsequent chronic pain syndrome.  He has had a spinal cord stimulator in the past in 2012 which did not work well for him and was removed.  He is now proceeding to try the next generation spinal cord stimulator to see if that helps with pain management.  He continues on Lyrica and intermittent use of hydrocodone at low doses.      See problem list for active medical problems.  Problems all longstanding and stable, except as noted/documented.  See ROS for pertinent symptoms related to these conditions.                                                                                                                                                          .    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 No pertinent past medical history.  Past Surgical History:   Procedure Laterality Date     EXTRACTION(S) DENTAL       LAPAROSCOPIC CHOLECYSTECTOMY  06/26/2014     OPEN REDUCTION INTERNAL FIXATION CALCANEOUS Left 2008    Had 3 surgeries     Spinal cord stimulator  2012    Implanted and then removed about a year later     Current Outpatient Prescriptions   Medication Sig Dispense Refill     HYDROcodone-acetaminophen (NORCO) 5-325 MG per tablet Take 2 tablets by mouth  "three times a week maximum  tablet(s) per day 18 tablet 0     LYRICA 200 MG capsule Take 1 capsule by mouth 2 times daily       traZODone (DESYREL) 50 MG tablet Take 100 mg by mouth At Bedtime  90 tablet      OTC products: None, except as noted above    Allergies   Allergen Reactions     Gabapentin      Other reaction(s): Tremors     Methadone      Other reaction(s): Sleep Disturbances      Latex Allergy: NO    Social History   Substance Use Topics     Smoking status: Former Smoker     Packs/day: 0.75     Types: Cigarettes     Quit date: 4/2/2018     Smokeless tobacco: Never Used     Alcohol use No     History   Drug Use No     Comment: Drug use: No       REVIEW OF SYSTEMS:   CONSTITUTIONAL: NEGATIVE for fever, chills, change in weight  ENT/MOUTH: NEGATIVE for ear, mouth and throat problems  RESP: NEGATIVE for significant cough or SOB.  Recently quit smoking using Chantix.  CV: NEGATIVE for chest pain, palpitations or peripheral edema    EXAM:   /78  Pulse 76  Temp 97.1  F (36.2  C)  Ht 5' 9\" (1.753 m)  Wt 201 lb 12.8 oz (91.5 kg)  BMI 29.8 kg/m2  GENERAL APPEARANCE: healthy, alert and no distress  HENT: ear canals and TM's normal and nose and mouth without ulcers or lesions  RESP: lungs clear to auscultation - no rales, rhonchi or wheezes  CV: regular rate and rhythm, normal S1 S2, no S3 or S4 and no murmur, click or rub   ABDOMEN: soft, nontender, no HSM or masses and bowel sounds normal  NEURO: Normal strength and tone, sensory exam grossly normal, mentation intact and speech normal    DIAGNOSTICS:   No labs or EKG required for low risk surgery (cataract, skin procedure, breast biopsy, etc)    Recent Labs   Lab Test  02/13/18   0909  07/07/17   1759  06/08/17   1522   06/23/14   0128   06/21/14   1423   HGB   --   14.0  16.3   < >   --    < >   --    PLT   --   207  240   < >   --    < >   --    INR   --    --    --    --    --    --   1.0   NA  138   --    --    --   134*   < >   --    POTASSIUM  4.1  "  --    --    --   3.7   < >   --    CR  0.96   --    --    --   0.70*   < >   --     < > = values in this interval not displayed.        IMPRESSION:       The proposed surgical procedure is considered LOW risk.    REVISED CARDIAC RISK INDEX  The patient has the following serious cardiovascular risks for perioperative complications such as (MI, PE, VFib and 3  AV Block):  No serious cardiac risks    The patient has the following additional risks for perioperative complications:  No identified additional risks      ICD-10-CM    1. Pre-operative examination Z01.818    2. Left foot pain M79.672    3. Complex regional pain syndrome type 2 of left lower extremity G57.72    4. Preop general physical exam Z01.818        RECOMMENDATIONS:       APPROVAL GIVEN to proceed with proposed procedure, without further diagnostic evaluation       Signed Electronically by: Joan Burnett MD  Portions of this dictation were created using the Dragon [employer] voice recognition system. Proofreading was completed but there may be errors in text.      Copy of this evaluation report is provided to requesting physician.

## 2018-05-17 NOTE — MR AVS SNAPSHOT
After Visit Summary   5/17/2018    Ulises Greene    MRN: [id number]           Patient Information     Date Of Birth          1980        Visit Information        Provider Department      5/17/2018 3:45 PM Joan Burnett MD [facility]        Today's Diagnoses     Pre-operative examination    -  1    Left foot pain        Complex regional pain syndrome type 2 of left lower extremity          Care Instructions      Spinal Cord Stimulation    Pain messages travel over nerve pathways to the spinal cord, inside the spine. The spinal cord carries the messages to the brain. Constant pain messages can cause long-term pain that is hard to treat. This is known as chronic pain. Spinal cord stimulation uses a medical device to send signals to the nerve pathways inside your alcira cord. These signals help block the pain.  Keeping a pain log  Your doctor may ask you to keep a pain log for a certain amount of time. In it, you may answer certain questions: When do you feel pain? What does it feel like? How long does the pain last? What makes it better or worse? When the stimulation is on, is your pain relieved? Your answers help show how well spinal cord stimulation may work for you. Your doctor will give you guidelines for your pain log. During the time you write the log, you may not be able to take pain medications. Be sure to discuss this with your doctor.  Spinal cord stimulation may help  Spinal cord stimulation is one treatment for chronic pain. Certain criteria need to be met to be a good candidate for spinal cord stimulation. A small medical device sends signals to your spinal cord. These signals keep the chronic pain messages from being sent to your brain. Instead, you may feel tingling from the electrical signals. A trial stimulator that is worn outside the body in tried first to see if it will work. If it does, the permanent stimulator system may be used. This device can be  "removed at any time.  The stimulator system  The stimulator system has several parts. A power source makes the signals. This power source may be worn outside the body or implanted under the skin on your abdomen or buttocks. One or more leads (flexible, plastic-covered wires or paddle) are placed inside the body to carry the signals to the spinal cord. Your doctor can explain the system you ll be using in more detail.  Risks and possible complications    Infection    Bleeding    Nerve damage    Spinal cord damage    Failure to relieve pain   Date Last Reviewed: 10/19/2015    [id number] [employer]. [address] [place] [address]. All rights reserved. This information is not intended as a substitute for professional medical care. Always follow your healthcare professional's instructions.                Follow-ups after your visit        Who to contact     If you have questions or need follow up information about today's clinic visit or your schedule please contact [facility] directly at [phone].  Normal or non-critical lab and imaging results will be communicated to you by [employer], letter or phone within 4 business days after the clinic has received the results. If you do not hear from us within 7 days, please contact the clinic through [employer] or phone. If you have a critical or abnormal lab result, we will notify you by phone as soon as possible.  Submit refill requests through [employer] or call your pharmacy and they will forward the refill request to us. Please allow 3 business days for your refill to be completed.          Additional Information About Your Visit        [employer] Information     [employer] lets you send messages to your doctor, view your test results, renew your prescriptions, schedule appointments and more. To sign up, go to [email or web address] . Click on \"Log in\" on the left side of the screen, which will take you to the Welcome page. Then click " "on \"Sign up Now\" on the right side of the page.     You will be asked to enter the access code listed below, as well as some personal information. Please follow the directions to create your username and password.     Your access code is: [email or web address]  Expires: 8/15/2018  4:13 PM     Your access code will  in 90 days. If you need help or a new code, please call your [place] clinic or [phone].        Care EveryWhere ID     This is your Care EveryWhere ID. This could be used by other organizations to access your [place] medical records  [id number]        Your Vitals Were     Pulse Temperature Height BMI (Body Mass Index)          76 97.1  F (36.2  C) 5' 9\" (1.753 m) 29.8 kg/m2         Blood Pressure from Last 3 Encounters:   18 112/78   18 99/67   18 110/78    Weight from Last 3 Encounters:   18 201 lb 12.8 oz (91.5 kg)   18 198 lb (89.8 kg)   18 195 lb (88.5 kg)              Today, you had the following     No orders found for display       Primary Care Provider Office Phone # Fax #    Elizabeth Gomez [phone]       [address] [facility] [address]        Equal Access to Services     [facility]: Hadii chuyita churchillo Somihaela, waaxda luqadaha, qaybta kaalmada eusebia, benson huang . So [facility] [phone].    ATENCIÓN: Si habla español, tiene a mckeon disposición servicios gratuitos de asistencia lingüística. Llame al [phone].    We comply with applicable federal civil rights laws and [place] laws. We do not discriminate on the basis of race, color, national origin, age, disability, sex, sexual orientation, or gender identity.            Thank you!     Thank you for choosing [facility]  for your care. Our goal is always to provide you with excellent care. Hearing back from our patients is one way we can continue to improve our services. Please take a few minutes to " complete the written survey that you may receive in the mail after your visit with us. Thank you!             Your Updated Medication List - Protect others around you: Learn how to safely use, store and throw away your medicines at www.disposemymeds.org.          This list is accurate as of 5/17/18  4:13 PM.  Always use your most recent med list.                   Brand Name Dispense Instructions for use Diagnosis    HYDROcodone-acetaminophen 5-325 MG per tablet    NORCO    18 tablet    Take 2 tablets by mouth three times a week maximum  tablet(s) per day        LYRICA 200 MG capsule   Generic drug:  Pregabalin      Take 1 capsule by mouth 2 times daily        traZODone 50 MG tablet    DESYREL    90 tablet    Take 100 mg by mouth At Bedtime

## 2018-05-17 NOTE — NURSING NOTE
Date of Surgery: 05/24/2018   Type of Surgery: Spinal Cord Stimulator Trial  Surgeon: Dr. Laurent Ratliff  [facility]:  [facility]    Concetta Neves ....................  5/17/2018   3:57 PM

## 2018-05-17 NOTE — PATIENT INSTRUCTIONS
Spinal Cord Stimulation    Pain messages travel over nerve pathways to the spinal cord, inside the spine. The spinal cord carries the messages to the brain. Constant pain messages can cause long-term pain that is hard to treat. This is known as chronic pain. Spinal cord stimulation uses a medical device to send signals to the nerve pathways inside your alcira cord. These signals help block the pain.  Keeping a pain log  Your doctor may ask you to keep a pain log for a certain amount of time. In it, you may answer certain questions: When do you feel pain? What does it feel like? How long does the pain last? What makes it better or worse? When the stimulation is on, is your pain relieved? Your answers help show how well spinal cord stimulation may work for you. Your doctor will give you guidelines for your pain log. During the time you write the log, you may not be able to take pain medications. Be sure to discuss this with your doctor.  Spinal cord stimulation may help  Spinal cord stimulation is one treatment for chronic pain. Certain criteria need to be met to be a good candidate for spinal cord stimulation. A small medical device sends signals to your spinal cord. These signals keep the chronic pain messages from being sent to your brain. Instead, you may feel tingling from the electrical signals. A trial stimulator that is worn outside the body in tried first to see if it will work. If it does, the permanent stimulator system may be used. This device can be removed at any time.  The stimulator system  The stimulator system has several parts. A power source makes the signals. This power source may be worn outside the body or implanted under the skin on your abdomen or buttocks. One or more leads (flexible, plastic-covered wires or paddle) are placed inside the body to carry the signals to the spinal cord. Your doctor can explain the system you ll be using in more detail.  Risks and possible  complications    Infection    Bleeding    Nerve damage    Spinal cord damage    Failure to relieve pain   Date Last Reviewed: 10/19/2015    [id number] [employer]. [address] [place] [address], [place] [address]. All rights reserved. This information is not intended as a substitute for professional medical care. Always follow your healthcare professional's instructions.          Before Your Surgery      Call your surgeon if there is any change in your health. This includes signs of a cold or flu (such as a sore throat, runny nose, cough, rash or fever).    Do not smoke, drink alcohol or take over the counter medicine (unless your surgeon or primary care doctor tells you to) for the 24 hours before and after surgery.    If you take prescribed drugs: Follow your doctor s orders about which medicines to take and which to stop until after surgery.    Eating and drinking prior to surgery: follow the instructions from your surgeon    Take a shower or bath the night before surgery. Use the soap your surgeon gave you to gently clean your skin. If you do not have soap from your surgeon, use your regular soap. Do not shave or scrub the surgery site.  Wear clean pajamas and have clean sheets on your bed.

## 2018-06-14 ENCOUNTER — APPOINTMENT (OUTPATIENT)
Dept: GENERAL RADIOLOGY | Facility: OTHER | Age: 38
End: 2018-06-14
Attending: FAMILY MEDICINE

## 2018-06-14 ENCOUNTER — HOSPITAL ENCOUNTER (EMERGENCY)
Facility: OTHER | Age: 38
Discharge: HOME | End: 2018-06-14
Attending: FAMILY MEDICINE | Admitting: FAMILY MEDICINE

## 2018-06-14 VITALS
TEMPERATURE: 98.4 F | DIASTOLIC BLOOD PRESSURE: 80 MMHG | WEIGHT: 210 LBS | HEIGHT: 69 IN | OXYGEN SATURATION: 97 % | RESPIRATION RATE: 16 BRPM | BODY MASS INDEX: 31.1 KG/M2 | HEART RATE: 78 BPM | SYSTOLIC BLOOD PRESSURE: 141 MMHG

## 2018-06-14 DIAGNOSIS — G90.522 REFLEX SYMPATHETIC DYSTROPHY OF LEFT LOWER EXTREMITY: ICD-10-CM

## 2018-06-14 PROCEDURE — 99283 EMERGENCY DEPT VISIT LOW MDM: CPT | Mod: 25 | Performed by: FAMILY MEDICINE

## 2018-06-14 PROCEDURE — 99284 EMERGENCY DEPT VISIT MOD MDM: CPT | Mod: Z6 | Performed by: FAMILY MEDICINE

## 2018-06-14 PROCEDURE — 73630 X-RAY EXAM OF FOOT: CPT | Mod: LT

## 2018-06-14 PROCEDURE — 25000132 ZZH RX MED GY IP 250 OP 250 PS 637: Performed by: FAMILY MEDICINE

## 2018-06-14 RX ORDER — DIAZEPAM 5 MG
5 TABLET ORAL ONCE
Status: COMPLETED | OUTPATIENT
Start: 2018-06-14 | End: 2018-06-14

## 2018-06-14 RX ORDER — OXYCODONE HYDROCHLORIDE 5 MG/1
10 TABLET ORAL ONCE
Status: COMPLETED | OUTPATIENT
Start: 2018-06-14 | End: 2018-06-14

## 2018-06-14 RX ADMIN — OXYCODONE HYDROCHLORIDE 10 MG: 5 TABLET ORAL at 22:49

## 2018-06-14 RX ADMIN — DIAZEPAM 5 MG: 5 TABLET ORAL at 22:50

## 2018-06-14 NOTE — ED AVS SNAPSHOT
[facility]    [address]    [place] [address]    Phone:  [phone]    Fax:  [phone]                                       Ulises Greene   MRN: [id number]    Department:  [facility]   Date of Visit:  6/14/2018           After Visit Summary Signature Page     I have received my discharge instructions, and my questions have been answered. I have discussed any challenges I see with this plan with the nurse or doctor.    ..........................................................................................................................................  Patient/Patient Representative Signature      ..........................................................................................................................................  Patient Representative Print Name and Relationship to Patient    ..................................................               ................................................  Date                                            Time    ..........................................................................................................................................  Reviewed by Signature/Title    ...................................................              ..............................................  Date                                                            Time

## 2018-06-14 NOTE — ED AVS SNAPSHOT
[facility]    [address]    [place] [address]    Phone:  [phone]    Fax:  [phone]                                       Ulises Greene   MRN: [id number]    Department:  [facility]   Date of Visit:  6/14/2018           Patient Information     Date Of Birth          1980        Your diagnoses for this visit were:     Reflex sympathetic dystrophy of left lower extremity        You were seen by Beverly Contreras MD.      Follow-up Information     Follow up with Elizabeth Corrigan MD.    Specialty:  Family Practice    Why:  or establish care with [facility] or alternative chronic pain clinic     Contact information:    [address]  [place] [address] [phone]          Discharge Instructions         Understanding the Pain Response  Your pain is important. It can slow healing and keep you from being active. You may have acute or chronic pain. Both types of pain respond to treatment. Work with your healthcare professional. Together you can find relief.  Types of pain  Acute pain is caused by a health problem or injury. The pain usually goes away when its cause is treated. You may have pain:    From an illness or injury that needs emergency care    After an operation, such as heart surgery    During and after the birth of your baby  Chronic pain lasts 3 to 6 months or more. It can be caused by a health problem or injury, like arthritis or a shoulder strain. Chronic pain can also exist without a clear cause.  Your perception of pain  Pain is a complex phenomenon that involves many of the chemicals found naturally in the spinal cord and brain. All pain signals travel to the brain. The brain sends back signals to protect the body. The brain also makes its own painkillers (endorphins). These can help reduce the pain.     1. Pain starts in 1 or more parts of the body. In some cases, the site of the pain is far from  its source.  2. Pain signals move through nerves and up the spinal cord.  3. The brain reads the signals as pain. Natural painkillers are released.  4. The feeling of pain can be reduced in this way.  Date Last Reviewed: 5/1/2017 2000-2017 [employer]. [address] [place] [address], [place] [address]. All rights reserved. This information is not intended as a substitute for professional medical care. Always follow your healthcare professional's instructions.          Taking Opioid Medicines  For your health and safety, it s important to take opioids exactly as directed. This helps ensure that the medicines work as they should. It also lowers the chances of side effects. And it lowers the risk of taking too high a dose (overdose). Each opioid medicine has its own instructions for use. Your healthcare provider will explain the ones you re prescribed and tell you how to take them. If you have questions or concerns, talk with your healthcare provider.   Using opioids safely  Opioids can work very well to relieve pain. But taking too much, taking them too long, or taking them in the wrong way can be harmful. To help reduce the risks to your health, follow these safety tips:    Make sure you know if you are to take the medicine on a regular basis or only as needed.    If your medicine is taken on a regular basis, take it on time and at the right dose. If you miss a dose, don t double up the next dose.    Use a medicine log, jose, or calendar to keep track of when you take your medicine. This helps you stay on schedule and not miss doses or take extra doses.    When taking liquid doses, use a measuring spoon or dropper. This ensures you take the correct dose.    Tell your healthcare provider right away if you have any side effects.    Don t cut, crush, or change your medicine in any way.    Don t take someone else s opioids. Don t share yours with other people.    Don t drive while taking opioids.    Don t use  dangerous equipment or power tools while taking opioids.    Check expiration dates regularly. Dispose of all  medicines properly.   Beware of combining medicines  Some medicines can be dangerous when used with opioids. In some cases, combining medicines can cause death. Make sure to tell your healthcare provider and pharmacist about all of the medicines you re taking. This includes over-the-counter medicines. It also includes vitamins, herbs, and other supplements. And it includes illegal or street drugs. Medicines that may be unsafe to use with opioids include:    Over-the-counter pain relievers, such as acetaminophen    Other prescription opioids    Benzodiazepines such as clonazepam or alprazolam    Muscle relaxants such as cyclobenzaprine or carisoprodol    Hypnotics such as sleep aids like zolpidem  WARNING: Don t take opioids with alcohol or street drugs. This can cause death.     Symptoms of opioid overdose  Opioids act on the part of the brain that affects breathing. An overdose of opioids can slow breathing down too much. It can even stop your breathing. This can cause death. Call 911 right away if you think you or someone else has had an overdose.   Look for these 3 key symptoms:    The dark circles in the middle of the eyes are very small (pinpoint pupils)    Breathing is slow or stopped    The person has passed out and does not respond(is not conscious)  Other symptoms to look for include:    Limp body    Pale face    Cool, damp skin    Purple or blue tint to lips and fingernails    Vomiting   Storing opioids safely  Opioids need to be stored safely. This helps protect anyone else from accidentally taking the medicine. It also helps prevent the theft and misuse of the medicine. If possible, store the medicine in a locked container or cupboard that others cannot get to. Store the medicine in a cool dry place. Don t store it in a damp place, such as a bathroom. Always put the medicine back in its  secure place after each use.   How to dispose opioids  Dispose of unused or  opioids in a safe way. This is to prevent harm to other people. Don t save your medicine or give it to other people for any reason. Even a single dose of opioids can lead to death if it used by someone else. To dispose of your medicine safely:    Find your [facility] [place] take-back program. You may need to drop the medicine off at a local police station or pharmacy.    Ask your pharmacy about a mail-back program. You may be able to send the medicine through the mail. This is done using a special envelope.  If these options are not available to you, ask your healthcare provider for help.                           The FDA also has guidelines for disposing of medicines. You may be able to flush them down the toilet. Or you may be able to put them in the trash. Check with your local water and waste management company to see what is allowed in your city or state. You can learn more here: www.fda.gov/drugs/resourcesforyou/consumers/buyingusingmedicinesafely/ensuringsafeuseofmedicine/safedisposalofmedicines/uvp907970.htm. Before using these options, check with your local water and waste management company to determine if this is allowed in your city or state.    Stopping opioid treatment  If you have been taking an opioid for more than a few weeks, your body gets used to having it. When you stop taking the medicine, you can have withdrawal symptoms. There are many kinds of withdrawal symptoms. They can range from mild to severe. They can include:    Restlessness    Anxiety    Muscle aches    Sweating    Large (dilated) pupils    Watery eyes    Runny nose    Trouble sleeping    Nausea and vomiting    Abdominal cramping    Diarrhea    Fast heartbeat  Don t stop opioid medicine without help from your healthcare provider. You will need a plan to safely stop taking it. This is to help manage withdrawal symptoms. In most cases, the amount of  medicine you take will be cut down. You will take less and less over several weeks. If needed, you may need to take other medicines and treatments to help with this process. As the opioid medicine leaves your body, your body will adapt. Your withdrawal symptoms should then go away. How long this takes can vary for each person.  Date Last Reviewed: 8/1/2017 2000-2017 [employer]. [address] [place] [address], [place] [address]. All rights reserved. This information is not intended as a substitute for professional medical care. Always follow your healthcare professional's instructions.          Treating Reflex Sympathetic Dystrophy  Treatment for reflex sympathetic dystrophy (RSD), also known as complex regional pain syndrome (CRPS-1), starts with therapy that teaches you ways to move the affected region. But if your pain prevents this therapy, you may have other treatment first. No matter what the treatment, the sooner you get it, the better your chance to get better.  Physical, occupational, and hand therapy  Physical, occupational, and hand therapy aim to improve movement, build strength, and reduce pain. Which therapy you receive depends on which part of your body is affected by RSD. The goal of therapy is to help you to learn ways to use the affected region as normally as possible. For instance, if RSD affects your leg and foot, you may work with a therapist to walk more. Or, if you ve lost some hand or arm use, you may learn exercises to regain some of that function.  Treatment also may include desensitization. This involves rubbing different textures on the injured region. Heat or cold also may be used. Treatment can help you get used to things touching your hand or foot. This may help reduce pain in the long term.  Medical treatment  Your healthcare provider may suggest certain treatments for your symptoms. The goal is to reduce your pain and to get you moving again. Treatment may include:    Oral  medicines to relieve pain including ion channel blockers such as gabapentin or medicines to reduce sympathetic nerve activity, such as phenoxybenzamine    Corticosteroids and other anti-inflammatory medicines can be useful    Lidocaine patches or other topical medicines    Nerve blocks to stop pain signals    Spinal cord stimulators to send electrical signals that block pain    Sympathectomy to destroy an autonomic nerve that might contribute to the pain. This is done either through chemical injection or surgery.    Electroacupuncture may help treat early, mild cases  Other treatment  RSD is complex and painful. You may feel depressed or angry about having it. Psychological therapy and RSD support groups may help you deal with those feelings. Other treatment also may help you cope. Biofeedback, for instance, can make you more aware of your body s pain signals. This may help you learn how to control pain and the stress it may cause.   Date Last Reviewed: 12/1/2017 2000-2017 [employer]. [address] [place] [address]. All rights reserved. This information is not intended as a substitute for professional medical care. Always follow your healthcare professional's instructions.          What is Reflex Sympathetic Dystrophy?  Reflex sympathetic dystrophy is a rare disorder of the sympathetic nervous system. It is characterized by chronic severe burning pain usually in the arms, fingers, palm of hand, shoulder, or legs. If untreated, the pain and weakness that RSD may cause limited use of the injured region.  Reflex sympathetic dystrophy (RSD) is also known as complex regional pain syndrome (CRPS, Type 1). CRPS type 2 was formerly called causalgia.  RSD or CRPS-1 is the term used when there is no prior nerve injury.  CRPS-2 is used when there has been prior nerve injury. Both have the same symptoms and clinical picture.  What triggers RSD?  The cause of RSD is unknown. CRPS-2 may be caused by  injury. Getting injured may trigger RSD. It can be something minor, like a sprain or a cut. Or, it may be more severe, like a fracture or a surgery, such as carpal tunnel release. As you re healing, you may feel new, severe pain in the injured region. That pain may spread through the injured limb. Over time, other symptoms may appear.  Symptoms and signs of RSD  If you aren t treated soon, the symptoms of RSD can worsen or change over time. Below are symptoms and signs that can occur in the injured region:  Early-stage RSD  Symptoms include:    Prolonged, severe, burning pain    Sensitive to touch (pain from physical contact that normally would not be painful)     Swollen, blotchy, reddish or purple look    Stiffening joints    Change in temperature to the affected body part as compared to the other limb    Abnormal sweating pattern in the affected or surrounding areas   Late-stage RSD  Symptoms include:    Skin slowly withering    Skin that appears shiny and thin    Loss of strength    Changes in hair and nail growth patterns    Ridges in skin look flatter than normal    Shrinkage of the affected limb    Constant swelling of the foot or hand    Spreading to other limbs    Abnormal movements of the involved extremity such as poor coordination, tremor, and involuntary movements  Date Last Reviewed: 12/1/2017 2000-2017 [employer] [address] [place] [address]. All rights reserved. This information is not intended as a substitute for professional medical care. Always follow your healthcare professional's instructions.          24 Hour Appointment Hotline     To schedule an appointment at [facility], please call [phone]. If you don't have a family doctor or clinic, we will help you find one. [place] clinics are conveniently located to serve the needs of you and your family.           Review of your medicines      START taking        Dose / Directions Last dose taken    tiZANidine 4  MG tablet   Commonly known as:  ZANAFLEX   Dose:  4 mg   Quantity:  20 tablet        Take 1 tablet (4 mg) by mouth 3 times daily as needed for muscle spasms (MUSCLE SPASM)   Refills:  0          Our records show that you are taking the medicines listed below. If these are incorrect, please call your family doctor or clinic.        Dose / Directions Last dose taken    HYDROcodone-acetaminophen 5-325 MG per tablet   Commonly known as:  NORCO   Dose:  2 tablet   Quantity:  18 tablet        Take 2 tablets by mouth three times a week maximum  tablet(s) per day   Refills:  0        LYRICA 200 MG capsule   Dose:  1 capsule   Generic drug:  Pregabalin        Take 1 capsule by mouth 2 times daily   Refills:  0        traZODone 50 MG tablet   Commonly known as:  DESYREL   Dose:  100 mg   Quantity:  90 tablet        Take 100 mg by mouth At Bedtime   Refills:  0                Prescriptions were sent or printed at these locations (1 Prescription)                   [id number]  [place], [place] - [address]   [address], [place] [address]    Telephone:  [phone]   Fax:  [phone]   Hours:                  E-Prescribed (1 of 1)         tiZANidine (ZANAFLEX) 4 MG tablet                Procedures and tests performed during your visit     XR Foot Left G/E 3 Views      Orders Needing Specimen Collection     None      Pending Results     Date and Time Order Name Status Description    6/14/2018 2146 XR Foot Left G/E 3 Views In process             Pending Culture Results     No orders found from 6/12/2018 to 6/15/2018.            Pending Results Instructions     If you had any lab results that were not finalized at the time of your Discharge, you can call the ED Lab Result RN at [phone]. You will be contacted by this team for any positive Lab results or changes in treatment. The nurses are available 7 days a week from 10A to 6:30P.  You can leave a message 24 hours per day and they will return your call.       "  Thank you for choosing [facility]       Thank you for choosing [facility] for your care. Our goal is always to provide you with excellent care. Hearing back from our patients is one way we can continue to improve our services. Please take a few minutes to complete the written survey that you may receive in the mail after you visit with us. Thank you!        [employer] Information     [employer] lets you send messages to your doctor, view your test results, renew your prescriptions, schedule appointments and more. To sign up, go to [email or web address] . Click on \"Log in\" on the left side of the screen, which will take you to the Welcome page. Then click on \"Sign up Now\" on the right side of the page.     You will be asked to enter the access code listed below, as well as some personal information. Please follow the directions to create your username and password.     Your access code is: [email or web address]  Expires: 8/15/2018  4:13 PM     Your access code will  in 90 days. If you need help or a new code, please call your [facility] clinic or [phone].        Care EveryWhere ID     This is your Care EveryWhere ID. This could be used by other organizations to access your [facility] medical records  [id number]        Equal Access to Services     CHECO RUSHING : Jennifer churchillo Sherly, waaxda luqadaha, qaybta kaalmada adesherleyyada, benson montemayor. So [facility] [phone].    ATENCIÓN: Si habla español, tiene a mckeon disposición servicios gratuitos de asistencia lingüística. Llame al [phone].    We comply with applicable federal civil rights laws and [place] laws. We do not discriminate on the basis of race, color, national origin, age, disability, sex, sexual orientation, or gender identity.            After Visit Summary       This is your record. Keep this with you and show to your community pharmacist(s) and doctor(s) at your next visit.                  "

## 2018-06-15 ASSESSMENT — ENCOUNTER SYMPTOMS
MUSCULOSKELETAL NEGATIVE: 1
CARDIOVASCULAR NEGATIVE: 1
EYES NEGATIVE: 1
RESPIRATORY NEGATIVE: 1
NEUROLOGICAL NEGATIVE: 1
CONSTITUTIONAL NEGATIVE: 1

## 2018-06-15 NOTE — ED TRIAGE NOTES
Patient has had chronic left foot pain for 10 years, has neuropathy in this foot. Today stepped on it wrong and now cannot bear weight on the inside edge of his left foot.

## 2018-06-15 NOTE — ED PROVIDER NOTES
History     Chief Complaint   Patient presents with     Foot Pain     HPI  Ulises Greene is a 37 year old male who presents for left foot injury . Pateint states that he just stepped on foot today and since then has pain on inner surface of his left foot. Was wearing his work shoe with orthotic . Patient does not feel its more swollen that usual . Has chronic regional pain syndrome  from previous crush injury to the foot. Usually cant touch his toes due to pain but today numb . Patient previously received his pain medications from  A pain  Clinic in [facility] but that doctor is retiring so he needs to establish care with a new pain clinic. 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 No pertinent past medical history.    Past Surgical History:    Past Surgical History:   Procedure Laterality Date     EXTRACTION(S) DENTAL       LAPAROSCOPIC CHOLECYSTECTOMY  06/26/2014     OPEN REDUCTION INTERNAL FIXATION CALCANEOUS Left 2008    Had 3 surgeries     Spinal cord stimulator  2012    Implanted and then removed about a year later       Family History:    No family history on file.    Social History:  Marital Status:   [2]  Social History   Substance Use Topics     Smoking status: Former Smoker     Packs/day: 0.75     Types:  "Cigarettes     Quit date: 4/2/2018     Smokeless tobacco: Never Used     Alcohol use No        Medications:      HYDROcodone-acetaminophen (NORCO) 5-325 MG per tablet   LYRICA 200 MG capsule   traZODone (DESYREL) 50 MG tablet         Review of Systems   Constitutional: Negative.    HENT: Negative.    Eyes: Negative.    Respiratory: Negative.    Cardiovascular: Negative.    Genitourinary: Negative.    Musculoskeletal: Negative.    Skin: Negative.    Neurological: Negative.    All other systems reviewed and are negative.       Physical Exam   BP: 141/80  Pulse: 78  Heart Rate: 80  Temp: 98.4  F (36.9  C)  Resp: 16  Height: 175.3 cm (5' 9\")  Weight: 95.3 kg (210 lb)  SpO2: 97 %      Physical Exam   Constitutional: He is oriented to person, place, and time. He appears well-developed and well-nourished. No distress.   HENT:   Head: Normocephalic and atraumatic.   Eyes: Pupils are equal, round, and reactive to light.   Neck: Normal range of motion. Neck supple.   Cardiovascular: Normal rate and regular rhythm.    Pulmonary/Chest: Effort normal and breath sounds normal.   Abdominal: Soft. Bowel sounds are normal.   Musculoskeletal: Normal range of motion. He exhibits no edema, tenderness or deformity.   Neurological: He is alert and oriented to person, place, and time.   Skin: Skin is warm. He is not diaphoretic.   Inner aspect left foot with scar . Area of scar tender to palpation . No other acute abnormalities appreciated    Psychiatric: He has a normal mood and affect. His behavior is normal. Judgment and thought content normal.   Nursing note and vitals reviewed.      ED Course     ED Course     Procedures          Patient arrived to ER with complaint of increased left foot pain . Patient triaged to exam room . Vitals signs obtained. Medical records reviewed. History and exam completed. Xray ordered. NO acute findings. Discussed management of his chronic pain from chronic regional pain syndrome. Patient given 10 mg " oxycodone and 5 mg of valium in ED prior to discharge with responsible .  Trial of zanaflex sent to preferred pharmacy . Patient given contact information for chronic pain clinic   No results found for this or any previous visit (from the past 24 hour(s)).    Medications - No data to display    Assessments & Plan (with Medical Decision Making)     I have reviewed the nursing notes.    I have reviewed the findings, diagnosis, plan and need for follow up with the patient.      New Prescriptions    No medications on file       Final diagnoses:   Reflex sympathetic dystrophy of left lower extremity       6/14/2018   [facility] AND \[facility] [place] [facility]\"" Beverly Banks MD  06/15/18 [id number]

## 2018-06-15 NOTE — DISCHARGE INSTRUCTIONS
Understanding the Pain Response  Your pain is important. It can slow healing and keep you from being active. You may have acute or chronic pain. Both types of pain respond to treatment. Work with your healthcare professional. Together you can find relief.  Types of pain  Acute pain is caused by a health problem or injury. The pain usually goes away when its cause is treated. You may have pain:    From an illness or injury that needs emergency care    After an operation, such as heart surgery    During and after the birth of your baby  Chronic pain lasts 3 to 6 months or more. It can be caused by a health problem or injury, like arthritis or a shoulder strain. Chronic pain can also exist without a clear cause.  Your perception of pain  Pain is a complex phenomenon that involves many of the chemicals found naturally in the spinal cord and brain. All pain signals travel to the brain. The brain sends back signals to protect the body. The brain also makes its own painkillers (endorphins). These can help reduce the pain.     1. Pain starts in 1 or more parts of the body. In some cases, the site of the pain is far from its source.  2. Pain signals move through nerves and up the spinal cord.  3. The brain reads the signals as pain. Natural painkillers are released.  4. The feeling of pain can be reduced in this way.  Date Last Reviewed: 5/1/2017 2000-2017 [employer]. [address] [place] [address]. All rights reserved. This information is not intended as a substitute for professional medical care. Always follow your healthcare professional's instructions.          Taking Opioid Medicines  For your health and safety, it s important to take opioids exactly as directed. This helps ensure that the medicines work as they should. It also lowers the chances of side effects. And it lowers the risk of taking too high a dose (overdose). Each opioid medicine has its own instructions for use. Your  healthcare provider will explain the ones you re prescribed and tell you how to take them. If you have questions or concerns, talk with your healthcare provider.   Using opioids safely  Opioids can work very well to relieve pain. But taking too much, taking them too long, or taking them in the wrong way can be harmful. To help reduce the risks to your health, follow these safety tips:    Make sure you know if you are to take the medicine on a regular basis or only as needed.    If your medicine is taken on a regular basis, take it on time and at the right dose. If you miss a dose, don t double up the next dose.    Use a medicine log, jose, or calendar to keep track of when you take your medicine. This helps you stay on schedule and not miss doses or take extra doses.    When taking liquid doses, use a measuring spoon or dropper. This ensures you take the correct dose.    Tell your healthcare provider right away if you have any side effects.    Don t cut, crush, or change your medicine in any way.    Don t take someone else s opioids. Don t share yours with other people.    Don t drive while taking opioids.    Don t use dangerous equipment or power tools while taking opioids.    Check expiration dates regularly. Dispose of all  medicines properly.   Beware of combining medicines  Some medicines can be dangerous when used with opioids. In some cases, combining medicines can cause death. Make sure to tell your healthcare provider and pharmacist about all of the medicines you re taking. This includes over-the-counter medicines. It also includes vitamins, herbs, and other supplements. And it includes illegal or street drugs. Medicines that may be unsafe to use with opioids include:    Over-the-counter pain relievers, such as acetaminophen    Other prescription opioids    Benzodiazepines such as clonazepam or alprazolam    Muscle relaxants such as cyclobenzaprine or carisoprodol    Hypnotics such as sleep aids like  zolpidem  WARNING: Don t take opioids with alcohol or street drugs. This can cause death.     Symptoms of opioid overdose  Opioids act on the part of the brain that affects breathing. An overdose of opioids can slow breathing down too much. It can even stop your breathing. This can cause death. Call 911 right away if you think you or someone else has had an overdose.   Look for these 3 key symptoms:    The dark circles in the middle of the eyes are very small (pinpoint pupils)    Breathing is slow or stopped    The person has passed out and does not respond(is not conscious)  Other symptoms to look for include:    Limp body    Pale face    Cool, damp skin    Purple or blue tint to lips and fingernails    Vomiting   Storing opioids safely  Opioids need to be stored safely. This helps protect anyone else from accidentally taking the medicine. It also helps prevent the theft and misuse of the medicine. If possible, store the medicine in a locked container or cupboard that others cannot get to. Store the medicine in a cool dry place. Don t store it in a damp place, such as a bathroom. Always put the medicine back in its secure place after each use.   How to dispose opioids  Dispose of unused or  opioids in a safe way. This is to prevent harm to other people. Don t save your medicine or give it to other people for any reason. Even a single dose of opioids can lead to death if it used by someone else. To dispose of your medicine safely:    Find your [facility] medicine take-back program. You may need to drop the medicine off at a local police station or pharmacy.    Ask your pharmacy about a mail-back program. You may be able to send the medicine through the mail. This is done using a special envelope.  If these options are not available to you, ask your healthcare provider for help.                           The FDA also has guidelines for disposing of medicines. You may be able to flush them down the toilet. Or  you may be able to put them in the trash. Check with your local water and waste management company to see what is allowed in your city or state. You can learn more here: www.fda.gov/drugs/resourcesforyou/consumers/buyingusingmedicinesafely/ensuringsafeuseofmedicine/safedisposalofmedicines/ovb339568.htm. Before using these options, check with your local water and waste management company to determine if this is allowed in your city or state.    Stopping opioid treatment  If you have been taking an opioid for more than a few weeks, your body gets used to having it. When you stop taking the medicine, you can have withdrawal symptoms. There are many kinds of withdrawal symptoms. They can range from mild to severe. They can include:    Restlessness    Anxiety    Muscle aches    Sweating    Large (dilated) pupils    Watery eyes    Runny nose    Trouble sleeping    Nausea and vomiting    Abdominal cramping    Diarrhea    Fast heartbeat  Don t stop opioid medicine without help from your healthcare provider. You will need a plan to safely stop taking it. This is to help manage withdrawal symptoms. In most cases, the amount of medicine you take will be cut down. You will take less and less over several weeks. If needed, you may need to take other medicines and treatments to help with this process. As the opioid medicine leaves your body, your body will adapt. Your withdrawal symptoms should then go away. How long this takes can vary for each person.  Date Last Reviewed: 8/1/2017 2000-2017 [employer]. [address] [place] [address]. All rights reserved. This information is not intended as a substitute for professional medical care. Always follow your healthcare professional's instructions.          Treating Reflex Sympathetic Dystrophy  Treatment for reflex sympathetic dystrophy (RSD), also known as complex regional pain syndrome (CRPS-1), starts with therapy that teaches you ways to move the  affected region. But if your pain prevents this therapy, you may have other treatment first. No matter what the treatment, the sooner you get it, the better your chance to get better.  Physical, occupational, and hand therapy  Physical, occupational, and hand therapy aim to improve movement, build strength, and reduce pain. Which therapy you receive depends on which part of your body is affected by RSD. The goal of therapy is to help you to learn ways to use the affected region as normally as possible. For instance, if RSD affects your leg and foot, you may work with a therapist to walk more. Or, if you ve lost some hand or arm use, you may learn exercises to regain some of that function.  Treatment also may include desensitization. This involves rubbing different textures on the injured region. Heat or cold also may be used. Treatment can help you get used to things touching your hand or foot. This may help reduce pain in the long term.  Medical treatment  Your healthcare provider may suggest certain treatments for your symptoms. The goal is to reduce your pain and to get you moving again. Treatment may include:    Oral medicines to relieve pain including ion channel blockers such as gabapentin or medicines to reduce sympathetic nerve activity, such as phenoxybenzamine    Corticosteroids and other anti-inflammatory medicines can be useful    Lidocaine patches or other topical medicines    Nerve blocks to stop pain signals    Spinal cord stimulators to send electrical signals that block pain    Sympathectomy to destroy an autonomic nerve that might contribute to the pain. This is done either through chemical injection or surgery.    Electroacupuncture may help treat early, mild cases  Other treatment  RSD is complex and painful. You may feel depressed or angry about having it. Psychological therapy and RSD support groups may help you deal with those feelings. Other treatment also may help you cope. Biofeedback, for  instance, can make you more aware of your body s pain signals. This may help you learn how to control pain and the stress it may cause.   Date Last Reviewed: 12/1/2017 2000-2017 [employer]. [address] [place] [address], [place] [address]. All rights reserved. This information is not intended as a substitute for professional medical care. Always follow your healthcare professional's instructions.          What is Reflex Sympathetic Dystrophy?  Reflex sympathetic dystrophy is a rare disorder of the sympathetic nervous system. It is characterized by chronic severe burning pain usually in the arms, fingers, palm of hand, shoulder, or legs. If untreated, the pain and weakness that RSD may cause limited use of the injured region.  Reflex sympathetic dystrophy (RSD) is also known as complex regional pain syndrome (CRPS, Type 1). CRPS type 2 was formerly called causalgia.  RSD or CRPS-1 is the term used when there is no prior nerve injury.  CRPS-2 is used when there has been prior nerve injury. Both have the same symptoms and clinical picture.  What triggers RSD?  The cause of RSD is unknown. CRPS-2 may be caused by injury. Getting injured may trigger RSD. It can be something minor, like a sprain or a cut. Or, it may be more severe, like a fracture or a surgery, such as carpal tunnel release. As you re healing, you may feel new, severe pain in the injured region. That pain may spread through the injured limb. Over time, other symptoms may appear.  Symptoms and signs of RSD  If you aren t treated soon, the symptoms of RSD can worsen or change over time. Below are symptoms and signs that can occur in the injured region:  Early-stage RSD  Symptoms include:    Prolonged, severe, burning pain    Sensitive to touch (pain from physical contact that normally would not be painful)     Swollen, blotchy, reddish or purple look    Stiffening joints    Change in temperature to the affected body part as compared to the other  limb    Abnormal sweating pattern in the affected or surrounding areas   Late-stage RSD  Symptoms include:    Skin slowly withering    Skin that appears shiny and thin    Loss of strength    Changes in hair and nail growth patterns    Ridges in skin look flatter than normal    Shrinkage of the affected limb    Constant swelling of the foot or hand    Spreading to other limbs    Abnormal movements of the involved extremity such as poor coordination, tremor, and involuntary movements  Date Last Reviewed: 12/1/2017 2000-2017 [employer]. [address] [place] [address]. All rights reserved. This information is not intended as a substitute for professional medical care. Always follow your healthcare professional's instructions.

## 2018-06-26 ENCOUNTER — OFFICE VISIT (OUTPATIENT)
Dept: FAMILY MEDICINE | Facility: OTHER | Age: 38
End: 2018-06-26
Attending: FAMILY MEDICINE
Payer: COMMERCIAL

## 2018-06-26 VITALS
DIASTOLIC BLOOD PRESSURE: 72 MMHG | HEART RATE: 80 BPM | SYSTOLIC BLOOD PRESSURE: 114 MMHG | RESPIRATION RATE: 18 BRPM | BODY MASS INDEX: 29.86 KG/M2 | WEIGHT: 202.2 LBS | OXYGEN SATURATION: 97 %

## 2018-06-26 DIAGNOSIS — R05.9 COUGH: Primary | ICD-10-CM

## 2018-06-26 DIAGNOSIS — J20.9 ACUTE BRONCHITIS, UNSPECIFIED ORGANISM: ICD-10-CM

## 2018-06-26 PROCEDURE — 99213 OFFICE O/P EST LOW 20 MIN: CPT | Performed by: FAMILY MEDICINE

## 2018-06-26 RX ORDER — AZITHROMYCIN 250 MG/1
TABLET, FILM COATED ORAL
Qty: 6 TABLET | Refills: 0 | Status: SHIPPED | OUTPATIENT
Start: 2018-06-26 | End: 2018-07-12

## 2018-06-26 RX ORDER — CODEINE PHOSPHATE AND GUAIFENESIN 10; 100 MG/5ML; MG/5ML
1 SOLUTION ORAL EVERY 4 HOURS PRN
Qty: 120 ML | Refills: 0 | Status: SHIPPED | OUTPATIENT
Start: 2018-06-26 | End: 2018-07-12

## 2018-06-26 NOTE — NURSING NOTE
Pt presents to clinic today for cough and SOB with activity x 1.5 weeks. Pt denies any chest pain.  Charmaine Cortez

## 2018-06-26 NOTE — PROGRESS NOTES
SUBJECTIVE:   Ulises Greene is a 37 year old male who presents to clinic today for the following health issues:  Nursing Notes:   Charmaine Cortez, LPN  6/26/2018  2:03 PM  Signed  Pt presents to clinic today for cough and SOB with activity x 1.5 weeks. Pt denies any chest pain.  Charmaine FRANKIE Cortez      HPI    37-year-old male presents with 10 day history of harsh productive cough and mild shortness of breath.  He denies any chest pain, palpitations, wheezing.  He has no history of underlying lung disease.  He quit smoking 3 months ago.  He has done quite well off of Chantix.  Reports cough is keeping him up at night and he has had one episode of posttussive vomiting.  No recent fever, chills, night sweats or fatigue.  He has had no known sick exposures.  Immunizations are current.  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 No pertinent past medical history.   Past Surgical History:   Procedure Laterality Date     EXTRACTION(S) DENTAL       LAPAROSCOPIC CHOLECYSTECTOMY  06/26/2014     OPEN REDUCTION INTERNAL FIXATION CALCANEOUS Left 2008    Had 3 surgeries     Spinal cord stimulator  2012    Implanted and then removed about a year later       Review of Systems   See HPI.  OBJECTIVE:     /72 (BP Location: Right arm, Patient Position: Chair, Cuff Size: Adult Regular)  Pulse 80  Resp 18  Wt 202 lb 3.2 oz (91.7 kg)  SpO2 97%  BMI  29.86 kg/m2  Body mass index is 29.86 kg/(m^2).  Physical Exam   Constitutional: He appears well-developed.   HENT:   Right Ear: External ear normal.   Left Ear: External ear normal.   Cardiovascular: Normal rate and regular rhythm.    No murmur heard.  Pulmonary/Chest: Effort normal and breath sounds normal. No respiratory distress. He has no wheezes. He has no rales. He exhibits no tenderness.   Skin: No rash noted.       Diagnostic Test Results:  Results for orders placed or performed during the hospital encounter of 06/14/18   XR Foot Left G/E 3 Views    Narrative    PROCEDURE:  XR FOOT LT G/E 3 VW    HISTORY: injury , pain;     COMPARISON:  12/23/2016    TECHNIQUE:  3 views of the left foot were obtained.    FINDINGS:  There is been previous osteotomy of the second through  fifth metatarsal heads. There is fusion of the first MTP joint with a  screw in stable position. There has also been fusion of the second  through fourth PIP joints. There is an old fracture of the base of the  fifth metatarsal. Advanced degenerative changes of the midfoot are  again noted.       Impression    IMPRESSION: No acute fracture.  The appearance of the left foot is  unchanged from the prior study.    BOUBACAR NEWMAN MD       ASSESSMENT/PLAN:           ICD-10-CM    1. Cough R05 guaiFENesin-codeine (ROBITUSSIN AC) 100-10 MG/5ML SOLN solution   2. Acute bronchitis, unspecified organism J20.9 azithromycin (ZITHROMAX) 250 MG tablet     Given duration of illness will treat with course of antibiotics.  He is given a prescription for guaifenesin with codeine to use as needed at bedtime.  Congratulated on smoking cessation.  Elizabeth Flores MD  [facility]

## 2018-06-26 NOTE — MR AVS SNAPSHOT
After Visit Summary   6/26/2018    Ulises Greene    MRN: [id number]           Patient Information     Date Of Birth          1980        Visit Information        Provider Department      6/26/2018 2:00 PM Elizabeth Corrigan MD [facility]        Today's Diagnoses     Cough    -  1    Acute bronchitis, unspecified organism           Follow-ups after your visit        Who to contact     If you have questions or need follow up information about today's clinic visit or your schedule please contact [facility] directly at [phone].  Normal or non-critical lab and imaging results will be communicated to you by MyChart, letter or phone within 4 business days after the clinic has received the results. If you do not hear from us within 7 days, please contact the clinic through MyChart or phone. If you have a critical or abnormal lab result, we will notify you by phone as soon as possible.  Submit refill requests through [employer] or call your pharmacy and they will forward the refill request to us. Please allow 3 business days for your refill to be completed.          Additional Information About Your Visit        Care EveryWhere ID     This is your Care EveryWhere ID. This could be used by other organizations to access your [facility] medical records  [id number]        Your Vitals Were     Pulse Respirations Pulse Oximetry BMI (Body Mass Index)          80 18 97% 29.86 kg/m2         Blood Pressure from Last 3 Encounters:   06/26/18 114/72   06/14/18 141/80   05/17/18 112/78    Weight from Last 3 Encounters:   06/26/18 202 lb 3.2 oz (91.7 kg)   06/14/18 210 lb (95.3 kg)   05/17/18 201 lb 12.8 oz (91.5 kg)              Today, you had the following     No orders found for display         Today's Medication Changes          These changes are accurate as of 6/26/18  2:21 PM.  If you have any questions, ask your nurse or doctor.               Start taking  these medicines.        Dose/Directions    azithromycin 250 MG tablet   Commonly known as:  ZITHROMAX   Used for:  Acute bronchitis, unspecified organism   Started by:  Elizabeth Corrigan MD        Two tablets first day, then one tablet daily for four days.   Quantity:  6 tablet   Refills:  0       guaiFENesin-codeine 100-10 MG/5ML Soln solution   Commonly known as:  ROBITUSSIN AC   Used for:  Cough   Started by:  Elizabeth Corrigan MD        Dose:  1 tsp.   Take 5 mLs by mouth every 4 hours as needed for cough   Quantity:  120 mL   Refills:  0            Where to get your medicines      These medications were sent to [facility] - [place], [place] - [address] [facility]  [address] [address]     Phone:  [phone]     azithromycin 250 MG tablet         Some of these will need a paper prescription and others can be bought over the counter.  Ask your nurse if you have questions.     Bring a paper prescription for each of these medications     guaiFENesin-codeine 100-10 MG/5ML Soln solution                Primary Care Provider Office Phone # Fax #    Elizabeth Gomez [phone]       [address]  [facility] [address]        Equal Access to Services     [facility]: Hadii chuyita ku hadasho Soomaali, waaxda luqadaha, qaybta kaalmada adeegyada, waxay vijaya montemayor. So [facility] [phone].    ATENCIÓN: Si habla español, tiene a mckeon disposición servicios gratuitos de asistencia lingüística. Llame al [phone].    We comply with applicable federal civil rights laws and [place] laws. We do not discriminate on the basis of race, color, national origin, age, disability, sex, sexual orientation, or gender identity.            Thank you!     Thank you for choosing [facility]  for your care. Our goal is always to provide you with excellent care. Hearing back from our patients  is one way we can continue to improve our services. Please take a few minutes to complete the written survey that you may receive in the mail after your visit with us. Thank you!             Your Updated Medication List - Protect others around you: Learn how to safely use, store and throw away your medicines at www.disposemymeds.org.          This list is accurate as of 6/26/18  2:21 PM.  Always use your most recent med list.                   Brand Name Dispense Instructions for use Diagnosis    azithromycin 250 MG tablet    ZITHROMAX    6 tablet    Two tablets first day, then one tablet daily for four days.    Acute bronchitis, unspecified organism       guaiFENesin-codeine 100-10 MG/5ML Soln solution    ROBITUSSIN AC    120 mL    Take 5 mLs by mouth every 4 hours as needed for cough    Cough       HYDROcodone-acetaminophen 5-325 MG per tablet    NORCO    18 tablet    Take 2 tablets by mouth three times a week maximum  tablet(s) per day        LYRICA 200 MG capsule   Generic drug:  Pregabalin      Take 1 capsule by mouth 2 times daily        traZODone 50 MG tablet    DESYREL    90 tablet    Take 100 mg by mouth At Bedtime

## 2018-06-27 ASSESSMENT — PATIENT HEALTH QUESTIONNAIRE - PHQ9: SUM OF ALL RESPONSES TO PHQ QUESTIONS 1-9: 0

## 2018-07-12 ENCOUNTER — HOSPITAL ENCOUNTER (EMERGENCY)
Facility: OTHER | Age: 38
Discharge: HOME | End: 2018-07-13
Attending: EMERGENCY MEDICINE | Admitting: EMERGENCY MEDICINE
Payer: COMMERCIAL

## 2018-07-12 ENCOUNTER — APPOINTMENT (OUTPATIENT)
Dept: GENERAL RADIOLOGY | Facility: OTHER | Age: 38
End: 2018-07-12
Attending: EMERGENCY MEDICINE

## 2018-07-12 DIAGNOSIS — S62.660A CLOSED NONDISPLACED FRACTURE OF DISTAL PHALANX OF RIGHT INDEX FINGER, INITIAL ENCOUNTER: ICD-10-CM

## 2018-07-12 PROCEDURE — 73140 X-RAY EXAM OF FINGER(S): CPT | Mod: RT

## 2018-07-12 PROCEDURE — 99284 EMERGENCY DEPT VISIT MOD MDM: CPT | Mod: Z6 | Performed by: EMERGENCY MEDICINE

## 2018-07-12 PROCEDURE — 99283 EMERGENCY DEPT VISIT LOW MDM: CPT | Mod: 25 | Performed by: EMERGENCY MEDICINE

## 2018-07-12 NOTE — ED AVS SNAPSHOT
[facility]    [address]    [place] [address]    Phone:  [phone]    Fax:  [phone]                                       Ulises Greene   MRN: [id number]    Department:  [facility]   Date of Visit:  7/12/2018           After Visit Summary Signature Page     I have received my discharge instructions, and my questions have been answered. I have discussed any challenges I see with this plan with the nurse or doctor.    ..........................................................................................................................................  Patient/Patient Representative Signature      ..........................................................................................................................................  Patient Representative Print Name and Relationship to Patient    ..................................................               ................................................  Date                                            Time    ..........................................................................................................................................  Reviewed by Signature/Title    ...................................................              ..............................................  Date                                                            Time

## 2018-07-12 NOTE — ED AVS SNAPSHOT
[facility]    [address]    [place] [address]    Phone:  [phone]    Fax:  [phone]                                       Ulises Greene   MRN: [id number]    Department:  [facility]   Date of Visit:  7/12/2018           Patient Information     Date Of Birth          1980        Your diagnoses for this visit were:     Closed nondisplaced fracture of distal phalanx of right index finger, initial encounter        You were seen by Bigg Mata MD.        Discharge Instructions       1.  Leave the dressing on the splint on until you are seen by either your doctor or an orthopedic surgeon Monday next week  2.  Apply ice over the finger tonight and take ibuprofen/Tylenol as needed for pain  3.  Augmentin 1 tablet twice daily    24 Hour Appointment Hotline     To schedule an appointment at [facility], please call [phone]. If you don't have a family doctor or clinic, we will help you find one. [facility] clinics are conveniently located to serve the needs of you and your family.           Review of your medicines      START taking        Dose / Directions Last dose taken    amoxicillin-clavulanate 875-125 MG per tablet   Commonly known as:  AUGMENTIN   Dose:  1 tablet   Quantity:  3 tablet        Take 1 tablet by mouth 2 times daily for 2 days   Refills:  0          Our records show that you are taking the medicines listed below. If these are incorrect, please call your family doctor or clinic.        Dose / Directions Last dose taken    HYDROcodone-acetaminophen 5-325 MG per tablet   Commonly known as:  NORCO   Dose:  2 tablet   Quantity:  18 tablet        Take 2 tablets by mouth three times a week maximum  tablet(s) per day   Refills:  0        LYRICA 200 MG capsule   Dose:  1 capsule   Generic drug:  Pregabalin        Take 1 capsule by mouth 2 times daily   Refills:  0        traZODone 50 MG tablet   Commonly known as:  DESYREL   Dose:   100 mg   Quantity:  90 tablet        Take 100 mg by mouth At Bedtime   Refills:  0                Prescriptions were sent or printed at these locations (1 Prescription)                   [facility] - [place], [place] - [address]   [address] [place] [address]    Telephone:  [phone]   Fax:  [phone]   Hours:                  Printed at Department/Unit printer (1 of 1)         amoxicillin-clavulanate (AUGMENTIN) 875-125 MG per tablet                Procedures and tests performed during your visit     XR Finger Right G/E 2 Views      Orders Needing Specimen Collection     None      Pending Results     Date and Time Order Name Status Description    7/12/2018 2307 XR Finger Right G/E 2 Views In process             Pending Culture Results     No orders found for last 3 day(s).            Pending Results Instructions     If you had any lab results that were not finalized at the time of your Discharge, you can call the ED Lab Result RN at [phone]. You will be contacted by this team for any positive Lab results or changes in treatment. The nurses are available 7 days a week from 10A to 6:30P.  You can leave a message 24 hours per day and they will return your call.        Thank you for choosing [facility]       Thank you for choosing [facility] for your care. Our goal is always to provide you with excellent care. Hearing back from our patients is one way we can continue to improve our services. Please take a few minutes to complete the written survey that you may receive in the mail after you visit with us. Thank you!        Care EveryWhere ID     This is your Care EveryWhere ID. This could be used by other organizations to access your [facility] medical records  [id number]        Equal Access to Services     CHECO RUSHING : Jennifer Dubois, curtis frye, benson morrison. So [facility]  [phone].    ATENCIÓN: Si habla español, tiene a mckeon disposición servicios gratuitos de asistencia lingüística. Llame al [phone].    We comply with applicable federal civil rights laws and [place] laws. We do not discriminate on the basis of race, color, national origin, age, disability, sex, sexual orientation, or gender identity.            After Visit Summary       This is your record. Keep this with you and show to your community pharmacist(s) and doctor(s) at your next visit.

## 2018-07-13 VITALS
HEIGHT: 70 IN | OXYGEN SATURATION: 94 % | RESPIRATION RATE: 16 BRPM | SYSTOLIC BLOOD PRESSURE: 134 MMHG | WEIGHT: 201 LBS | BODY MASS INDEX: 28.77 KG/M2 | DIASTOLIC BLOOD PRESSURE: 88 MMHG | TEMPERATURE: 98.5 F | HEART RATE: 79 BPM

## 2018-07-13 PROCEDURE — 25000125 ZZHC RX 250: Performed by: EMERGENCY MEDICINE

## 2018-07-13 PROCEDURE — 25000132 ZZH RX MED GY IP 250 OP 250 PS 637: Performed by: EMERGENCY MEDICINE

## 2018-07-13 RX ORDER — GINSENG 100 MG
CAPSULE ORAL ONCE
Status: COMPLETED | OUTPATIENT
Start: 2018-07-13 | End: 2018-07-13

## 2018-07-13 RX ADMIN — BACITRACIN: 500 OINTMENT TOPICAL at 00:08

## 2018-07-13 RX ADMIN — AMOXICILLIN AND CLAVULANATE POTASSIUM 1 TABLET: 875; 125 TABLET, FILM COATED ORAL at 00:05

## 2018-07-13 NOTE — ED TRIAGE NOTES
Pt arrives to ED via private car with spouse for a finger injury.  Pt states that he smashed his right pointer finger in his truck door a few hours ago.  Pt states that his finger is black and is not sure if it is broken or not.

## 2018-07-13 NOTE — DISCHARGE INSTRUCTIONS
1.  Leave the dressing on the splint on until you are seen by either your doctor or an orthopedic surgeon Monday next week  2.  Apply ice over the finger tonight and take ibuprofen/Tylenol as needed for pain  3.  Augmentin 1 tablet twice daily

## 2018-07-13 NOTE — ED PROVIDER NOTES
History   No chief complaint on file.    HPI Comments: Is a 37-year-old coming into the emergency room with a traumatic pain distal right index finger.  Patient states he closed the finger at his cars door earlier tonight.  His last tetanus was last month according to the patient.  No other complaints reported.      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 No pertinent past medical history.    Past Surgical History:    Past Surgical History:   Procedure Laterality Date     EXTRACTION(S) DENTAL       LAPAROSCOPIC CHOLECYSTECTOMY  06/26/2014     OPEN REDUCTION INTERNAL FIXATION CALCANEOUS Left 2008    Had 3 surgeries     Spinal cord stimulator  2012    Implanted and then removed about a year later       Family History:    No family history on file.    Social History:  Marital Status:   [2]  Social History   Substance Use Topics     Smoking status: Former Smoker     Packs/day: 0.75     Types: Cigarettes     Quit date: 4/2/2018     Smokeless tobacco: Never Used     Alcohol use No        Medications:      amoxicillin-clavulanate (AUGMENTIN) 875-125 MG per tablet   HYDROcodone-acetaminophen (NORCO) 5-325 MG per tablet   LYRICA 200 MG capsule   traZODone (DESYREL) 50 MG tablet         Review of Systems   Musculoskeletal:        Traumatic pain distal right index   All other systems  "reviewed and are negative.      Physical Exam   BP: 134/89  Heart Rate: 96  Temp: 98.5  F (36.9  C)  Height: 176.5 cm (5' 9.5\")  Weight: 91.2 kg (201 lb)  SpO2: 96 %      Physical Exam   Constitutional: He appears well-developed and well-nourished. No distress.   Musculoskeletal:   There is bluish coloration underneath the proximal aspect of the nail of the right index finger with significant tenderness.  There is small superficial laceration at the junction of the nail and the skin at the ulnar base of the nail.  There is minimal induration at the distal aspect of the finger.  Range of motion at the DIP joint of the finger is quite limited.  However sensation is intact.  The finger is of normal color other than the bluish discoloration underneath the nail.       ED Course     There is minimally displaced fracture at the shaft of the distal phalanx of the right index finger.  There is a little cut of the skin at the base of the nail which may be communicating to the fracture making this potentially a open fracture.  Therefore patient received Augmentin 1 tablet in the emergency room and prescription to complete 2 days worth of Augmentin for infection prophylaxis.  Patient's tetanus is up-to-date.  Finger was cleaned with Betadine and irrigated with sterile saline.  With the use of cautery the sublingual hematoma was released.  Tolerated the procedure well.  Distal finger splint across the DIP joint applied and patient advised to follow-up with orthopedic surgery or primary care physician Monday next week.    ED Course     Procedures               Critical Care time:  none               No results found for this or any previous visit (from the past 24 hour(s)).    Medications   amoxicillin-clavulanate (AUGMENTIN) 875-125 MG per tablet 1 tablet (1 tablet Oral Given 7/13/18 0005)   bacitracin ointment ( Topical Given 7/13/18 0008)       Assessments & Plan (with Medical Decision Making)     I have reviewed the nursing " notes.    I have reviewed the findings, diagnosis, plan and need for follow up with the patient.       New Prescriptions    AMOXICILLIN-CLAVULANATE (AUGMENTIN) 875-125 MG PER TABLET    Take 1 tablet by mouth 2 times daily for 2 days       Final diagnoses:   Closed nondisplaced fracture of distal phalanx of right index finger, initial encounter       7/12/2018   [facility] AND [facility]     Bigg Mata MD  07/13/18 0017

## 2018-07-19 ENCOUNTER — OFFICE VISIT (OUTPATIENT)
Dept: FAMILY MEDICINE | Facility: OTHER | Age: 38
End: 2018-07-19
Attending: FAMILY MEDICINE
Payer: COMMERCIAL

## 2018-07-19 VITALS
BODY MASS INDEX: 29.29 KG/M2 | HEART RATE: 75 BPM | WEIGHT: 201.2 LBS | DIASTOLIC BLOOD PRESSURE: 72 MMHG | SYSTOLIC BLOOD PRESSURE: 105 MMHG

## 2018-07-19 DIAGNOSIS — S62.660D CLOSED NONDISPLACED FRACTURE OF DISTAL PHALANX OF RIGHT INDEX FINGER WITH ROUTINE HEALING, SUBSEQUENT ENCOUNTER: ICD-10-CM

## 2018-07-19 DIAGNOSIS — G89.29 OTHER CHRONIC PAIN: Primary | ICD-10-CM

## 2018-07-19 PROCEDURE — 99213 OFFICE O/P EST LOW 20 MIN: CPT | Performed by: FAMILY MEDICINE

## 2018-07-19 RX ORDER — HYDROCODONE BITARTRATE AND ACETAMINOPHEN 5; 325 MG/1; MG/1
2 TABLET ORAL
Qty: 30 TABLET | Refills: 0 | Status: SHIPPED | OUTPATIENT
Start: 2018-07-20 | End: 2019-04-25

## 2018-07-19 ASSESSMENT — PAIN SCALES - GENERAL: PAINLEVEL: MILD PAIN (2)

## 2018-07-19 NOTE — NURSING NOTE
Patient is here for follow up of ER visit for injury to finger. States got caught in door of truck. States x rays showed fracture.  Kamla Goodman LPN .............7/19/2018     11:07 AM

## 2018-07-19 NOTE — MR AVS SNAPSHOT
After Visit Summary   7/19/2018    Ulises Greene    MRN: [id number]           Patient Information     Date Of Birth          1980        Visit Information        Provider Department      7/19/2018 11:15 AM Elizabeth Corrigan MD [facility]        Today's Diagnoses     Other chronic pain    -  1    Closed nondisplaced fracture of distal phalanx of right index finger with routine healing, subsequent encounter           Follow-ups after your visit        Who to contact     If you have questions or need follow up information about today's clinic visit or your schedule please contact [facility] directly at [phone].  Normal or non-critical lab and imaging results will be communicated to you by MyChart, letter or phone within 4 business days after the clinic has received the results. If you do not hear from us within 7 days, please contact the clinic through MyChart or phone. If you have a critical or abnormal lab result, we will notify you by phone as soon as possible.  Submit refill requests through [employer] or call your pharmacy and they will forward the refill request to us. Please allow 3 business days for your refill to be completed.          Additional Information About Your Visit        Care EveryWhere ID     This is your Care EveryWhere ID. This could be used by other organizations to access your [facility] medical records  [id number]        Your Vitals Were     Pulse BMI (Body Mass Index)                75 29.29 kg/m2           Blood Pressure from Last 3 Encounters:   07/19/18 105/72   07/13/18 134/88   06/26/18 114/72    Weight from Last 3 Encounters:   07/19/18 201 lb 3.2 oz (91.3 kg)   07/12/18 201 lb (91.2 kg)   06/26/18 202 lb 3.2 oz (91.7 kg)              Today, you had the following     No orders found for display         Where to get your medicines      Some of these will need a paper prescription and others can be bought over the counter.   Ask your nurse if you have questions.     Bring a paper prescription for each of these medications     HYDROcodone-acetaminophen 5-325 MG per tablet         Information about OPIOIDS     PRESCRIPTION OPIOIDS: WHAT YOU NEED TO KNOW   We gave you an opioid (narcotic) pain medicine. It is important to manage your pain, but opioids are not always the best choice. You should first try all the other options your care team gave you. Take this medicine for as short a time (and as few doses) as possible.     These medicines have risks:    DO NOT drive when on new or higher doses of pain medicine. These medicines can affect your alertness and reaction times, and you could be arrested for driving under the influence (DUI). If you need to use opioids long-term, talk to your care team about driving.    DO NOT operate heave machinery    DO NOT do any other dangerous activities while taking these medicines.     DO NOT drink any alcohol while taking these medicines.      If the opioid prescribed includes acetaminophen, DO NOT take with any other medicines that contain acetaminophen. Read all labels carefully. Look for the word  acetaminophen  or  Tylenol.  Ask your pharmacist if you have questions or are unsure.    You can get addicted to pain medicines, especially if you have a history of addiction (chemical, alcohol or substance dependence). Talk to your care team about ways to reduce this risk.    Store your pills in a secure place, locked if possible. We will not replace any lost or stolen medicine. If you don t finish your medicine, please throw away (dispose) as directed by your pharmacist. The [place] Pollution [employer] has more information about safe disposal: https://www.pca.state.[place]/living-green/managing-unwanted-medications.     All opioids tend to cause constipation. Drink plenty of water and eat foods that have a lot of fiber, such as fruits, vegetables, prune juice, apple juice and high-fiber cereal. Take a  laxative (Miralax, milk of magnesia, Colace, Senna) if you don t move your bowels at least every other day.          Primary Care Provider Office Phone # Fax #    Elizabeth Gomez [phone] [address]  [facility] [address]        Equal Access to Services     [facility]: Hadii aad ku hadasho Soomaali, waaxda luqadaha, qaybta kaalmada adeegyada, waxay idiin hayaan adeeg kharalee laginan . So [facility] [place] [phone].    ATENCIÓN: Si habla español, tiene a mckeon disposición servicios gratuitos de asistencia lingüística. Llame al [phone].    We comply with applicable federal civil rights laws and [place] laws. We do not discriminate on the basis of race, color, national origin, age, disability, sex, sexual orientation, or gender identity.            Thank you!     Thank you for choosing [facility]  for your care. Our goal is always to provide you with excellent care. Hearing back from our patients is one way we can continue to improve our services. Please take a few minutes to complete the written survey that you may receive in the mail after your visit with us. Thank you!             Your Updated Medication List - Protect others around you: Learn how to safely use, store and throw away your medicines at www.disposemymeds.org.          This list is accurate as of 7/19/18 11:59 PM.  Always use your most recent med list.                   Brand Name Dispense Instructions for use Diagnosis    HYDROcodone-acetaminophen 5-325 MG per tablet    NORCO    30 tablet    Take 2 tablets by mouth three times a week maximum  tablet(s) per day    Other chronic pain       LYRICA 200 MG capsule   Generic drug:  Pregabalin      Take 1 capsule by mouth 2 times daily        traZODone 50 MG tablet    DESYREL    90 tablet    Take 100 mg by mouth At Bedtime

## 2018-07-24 NOTE — PROGRESS NOTES
SUBJECTIVE:   Ulises Greene is a 37 year old male who presents to clinic today for the following health issues:  Nursing Notes:   Kamla Goodman, LPN  7/19/2018 11:22 AM  Signed  Patient is here for follow up of ER visit for injury to finger. States got caught in door of truck. States x rays showed fracture.  Kamla Rutledgeley LPN .............7/19/2018     11:07 AM      HPI  37-year-old male presents for recheck after a finger injury.  He was seen in the emergency department diagnosed with a distal finger fracture, right index.    He also has a long history of chronic pain secondary to crush injury involving his midfoot.  He has been seen by the chronic pain clinic in [place].  He is in the process of transferring to a new pain clinic and is out of his hydrocodone.    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 No pertinent past medical history.   Past Surgical History:   Procedure Laterality Date     EXTRACTION(S) DENTAL       LAPAROSCOPIC CHOLECYSTECTOMY  06/26/2014     OPEN REDUCTION INTERNAL FIXATION CALCANEOUS Left 2008    Had 3 surgeries     Spinal cord stimulator  2012    Implanted and then removed about a year later       Review of Systems   See hPI  OBJECTIVE:     /72  Pulse 75  Wt 201 lb 3.2 oz (91.3 kg)  BMI 29.29 kg/m2  Body mass index is 29.29 kg/(m^2).  Physical Exam   Constitutional: He appears  well-developed.   Musculoskeletal:        Right hand: He exhibits tenderness. He exhibits no laceration.        Hands:      Diagnostic Test Results:  none     ASSESSMENT/PLAN:         1. Other chronic pain    2. Closed nondisplaced fracture of distal phalanx of right index finger with routine healing, subsequent encounter      Continue to wear splint to prevent reinjury.    Refilled medications x 1 month    I spent over 15 minutes with the patient, greater than 50% spent in counseling and coordination of care.    Elizabeth Flores MD  [facility]

## 2019-04-25 ENCOUNTER — HOSPITAL ENCOUNTER (EMERGENCY)
Facility: OTHER | Age: 39
Discharge: HOME | End: 2019-04-25
Attending: PHYSICIAN ASSISTANT | Admitting: PHYSICIAN ASSISTANT
Payer: COMMERCIAL

## 2019-04-25 ENCOUNTER — APPOINTMENT (OUTPATIENT)
Dept: GENERAL RADIOLOGY | Facility: OTHER | Age: 39
End: 2019-04-25
Attending: PHYSICIAN ASSISTANT
Payer: COMMERCIAL

## 2019-04-25 VITALS
HEART RATE: 83 BPM | TEMPERATURE: 98.5 F | BODY MASS INDEX: 29.62 KG/M2 | RESPIRATION RATE: 16 BRPM | DIASTOLIC BLOOD PRESSURE: 82 MMHG | HEIGHT: 69 IN | OXYGEN SATURATION: 95 % | SYSTOLIC BLOOD PRESSURE: 138 MMHG | WEIGHT: 200 LBS

## 2019-04-25 DIAGNOSIS — S92.355P: ICD-10-CM

## 2019-04-25 DIAGNOSIS — S93.402A LEFT ANKLE SPRAIN: ICD-10-CM

## 2019-04-25 DIAGNOSIS — S93.602A FOOT SPRAIN, LEFT, INITIAL ENCOUNTER: ICD-10-CM

## 2019-04-25 PROCEDURE — 25000128 H RX IP 250 OP 636: Performed by: PHYSICIAN ASSISTANT

## 2019-04-25 PROCEDURE — 96372 THER/PROPH/DIAG INJ SC/IM: CPT | Performed by: PHYSICIAN ASSISTANT

## 2019-04-25 PROCEDURE — 73610 X-RAY EXAM OF ANKLE: CPT | Mod: LT

## 2019-04-25 PROCEDURE — 73630 X-RAY EXAM OF FOOT: CPT | Mod: LT

## 2019-04-25 PROCEDURE — 99284 EMERGENCY DEPT VISIT MOD MDM: CPT | Mod: 25 | Performed by: PHYSICIAN ASSISTANT

## 2019-04-25 PROCEDURE — 99283 EMERGENCY DEPT VISIT LOW MDM: CPT | Mod: Z6 | Performed by: PHYSICIAN ASSISTANT

## 2019-04-25 RX ORDER — OXYCODONE AND ACETAMINOPHEN 5; 325 MG/1; MG/1
1 TABLET ORAL EVERY 6 HOURS PRN
Qty: 10 TABLET | Refills: 0 | Status: SHIPPED | OUTPATIENT
Start: 2019-04-25 | End: 2019-05-18

## 2019-04-25 RX ORDER — CLONAZEPAM 0.5 MG/1
0.5 TABLET ORAL
COMMUNITY
End: 2021-10-03

## 2019-04-25 RX ORDER — FENTANYL CITRATE 50 UG/ML
100 INJECTION, SOLUTION INTRAMUSCULAR; INTRAVENOUS ONCE
Status: COMPLETED | OUTPATIENT
Start: 2019-04-25 | End: 2019-04-25

## 2019-04-25 RX ORDER — KETOROLAC TROMETHAMINE 30 MG/ML
60 INJECTION, SOLUTION INTRAMUSCULAR; INTRAVENOUS ONCE
Status: COMPLETED | OUTPATIENT
Start: 2019-04-25 | End: 2019-04-25

## 2019-04-25 RX ADMIN — FENTANYL CITRATE 100 MCG: 50 INJECTION, SOLUTION INTRAMUSCULAR; INTRAVENOUS at 18:51

## 2019-04-25 RX ADMIN — KETOROLAC TROMETHAMINE 60 MG: 30 INJECTION, SOLUTION INTRAMUSCULAR at 18:52

## 2019-04-25 ASSESSMENT — ENCOUNTER SYMPTOMS
HEMATURIA: 0
SHORTNESS OF BREATH: 0
CHEST TIGHTNESS: 0
FEVER: 0
ADENOPATHY: 0
CHILLS: 0
BRUISES/BLEEDS EASILY: 0
CONFUSION: 0
ABDOMINAL PAIN: 0
BACK PAIN: 0
WOUND: 0

## 2019-04-25 ASSESSMENT — MIFFLIN-ST. JEOR: SCORE: 1807.57

## 2019-04-25 NOTE — ED PROVIDER NOTES
History     Chief Complaint   Patient presents with     Foot Pain     Ankle Pain     This is a 38-year-old male who took his first step getting out of bed this morning onto his left foot when he felt his ankle roll and severe pain in his foot.  He has had some chronic left foot fractures and surgeries.  He tried to go through the day but his pain continued and he is here for further evaluation.  He is on pain medical cannabis for pain.  He reports he is on a pain contract as well.            Allergies:  Allergies   Allergen Reactions     Gabapentin      Other reaction(s): Tremors     Methadone      Other reaction(s): Sleep Disturbances     Tramadol Other (See Comments)     tremors       Problem List:    Patient Active Problem List    Diagnosis Date Noted     Former smoker 05/17/2018     Priority: Medium     Complex regional pain syndrome type 2 of left lower extremity 10/24/2017     Priority: Medium     Left foot pain 12/23/2016     Priority: Medium     Onychomycosis 02/01/2016     Priority: Medium     Diastasis recti 08/10/2015     Priority: Medium     Hypertriglyceridemia 06/13/2014     Priority: Medium     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     Priority: Medium     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 No pertinent past medical history.    Past Surgical History:    Past Surgical History:   Procedure Laterality Date     EXTRACTION(S) DENTAL       LAPAROSCOPIC CHOLECYSTECTOMY  06/26/2014     OPEN REDUCTION INTERNAL FIXATION CALCANEOUS Left 2008    Had 3 surgeries     Spinal cord stimulator  2012    Implanted and then removed about a year later       Family History:    History reviewed. No pertinent family history.    Social History:  Marital Status:   [2]  Social History     Tobacco Use     Smoking status: Former Smoker     Packs/day: 0.75     Types:  "Cigarettes     Last attempt to quit: 2018     Years since quittin.0     Smokeless tobacco: Never Used   Substance Use Topics     Alcohol use: Yes     Alcohol/week: 0.5 oz     Comment: rare     Drug use: No     Comment: Drug use: No        Medications:      clonazePAM (KLONOPIN) 0.5 MG tablet   LYRICA 200 MG capsule   medical cannabis (Patient's own supply)   oxyCODONE-acetaminophen (PERCOCET) 5-325 MG tablet   traZODone (DESYREL) 50 MG tablet         Review of Systems   Constitutional: Negative for chills and fever.   HENT: Negative for congestion.    Eyes: Negative for visual disturbance.   Respiratory: Negative for chest tightness and shortness of breath.    Cardiovascular: Negative for chest pain.   Gastrointestinal: Negative for abdominal pain.   Genitourinary: Negative for hematuria.   Musculoskeletal: Negative for back pain.        Left ankle foot injury and pain.   Skin: Negative for rash and wound.   Neurological: Negative for syncope.   Hematological: Negative for adenopathy. Does not bruise/bleed easily.   Psychiatric/Behavioral: Negative for confusion.       Physical Exam   BP: 138/82  Pulse: 83  Temp: 98.5  F (36.9  C)  Resp: 16  Height: 175.3 cm (5' 9\")  Weight: 90.7 kg (200 lb)  SpO2: 95 %      Physical Exam   Constitutional: He appears well-developed and well-nourished. No distress.   HENT:   Head: Normocephalic and atraumatic.   Eyes: Conjunctivae are normal. No scleral icterus.   Neck: Normal range of motion. Neck supple.   Cardiovascular: Normal rate and regular rhythm.   Pulmonary/Chest: Effort normal.   Abdominal: Soft. There is no tenderness.   Musculoskeletal: Normal range of motion. He exhibits tenderness. He exhibits no deformity.   Left ankle with some medial malleolar tenderness to palpation.  Otherwise he has good flexion extension.  Some pain as well to his left lateral midfoot area just at the base of the fifth metatarsal.  He is able to wiggle his toes and CMS x4. "   Lymphadenopathy:     He has no cervical adenopathy.   Neurological: He is alert.   Skin: Skin is warm and dry. No rash noted. He is not diaphoretic.   Psychiatric: He has a normal mood and affect.       ED Course        Procedures             Results for orders placed or performed during the hospital encounter of 04/25/19 (from the past 24 hour(s))   XR Foot Left G/E 3 Views    Narrative    Exam: XR FOOT LT G/E 3 VW     History:Male, age 38 years, fall, foot pain and previous surgeries    Comparison:  6/14/2018    Technique: Three views are submitted.    Findings: Bones again demonstrate midfoot degenerative changes and  postoperative changes in the midfoot as well as forefoot. Healed  versus healing fracture again seen at the base of the fifth  metatarsal. No evidence of acute or subacute fracture.  No evidence of  dislocation.           Impression    Impression:  No evidence of acute or subacute bony abnormality.     Postoperative changes again seen in the forefoot.    Partially united fracture again seen at the base of the fifth  metatarsal.    LOUISE MCFARLANE MD   XR Ankle Left G/E 3 Views    Narrative    Exam: XR ANKLE LT G/E 3 VW     History:Male, age 38 years, pain medial ankle    Comparison:  None    Technique: Three views are submitted.    Findings: Bones are normally mineralized. No evidence of acute or  subacute fracture.  No evidence of dislocation.  Postoperative changes  in the great toe.           Impression    Impression:  No evidence of acute or subacute bony abnormality.    LOUISE MCFARLANE MD       Medications   fentaNYL (PF) (SUBLIMAZE) injection 100 mcg (100 mcg Intramuscular Given 4/25/19 1851)   ketorolac (TORADOL) injection 60 mg (60 mg Intramuscular Given 4/25/19 1852)       Assessments & Plan (with Medical Decision Making)     I have reviewed the nursing notes.    I have reviewed the findings, diagnosis, plan and need for follow up with the patient.         Medication List      Started     oxyCODONE-acetaminophen 5-325 MG tablet  Commonly known as:  PERCOCET  1 tablet, Oral, EVERY 6 HOURS PRN            Final diagnoses:   Foot sprain, left, initial encounter   Left ankle sprain   Closed nondisplaced fracture of fifth metatarsal bone of left foot with malunion   Afebrile.  Vital signs stable.  Left ankle foot injury this morning.  History of some chronic issues with this foot as well.  Left ankle x-rays unremarkable.  Left foot x-ray shows No evidence of acute or subacute bony abnormality.   Postoperative changes again seen in the forefoot.  Partially united fracture again seen at the base of the fifth metatarsal.  This could be acute on chronic pain at this point.  He was given IM Toradol as well as fentanyl.  He has a postop shoe at home as well as crutches.  I discussed rice.  Rx for short course of Percocet No. 10.  He will need to notify his pain management MD as well.  Follow-up as needed for further evaluation should his symptoms persist sooner if there is any other concerns problems or questions.  4/25/2019   [facility]     Damien Ledezma PA-C  04/25/19 1155

## 2019-04-25 NOTE — ED TRIAGE NOTES
Pt reports this morning he took his first step getting OOB and fell. Pt reports increased pain to left foot/ankle, hx chronic left foot surgeries.

## 2019-04-25 NOTE — ED AVS SNAPSHOT
[facility]  [address]  [place] [address]  Phone:  [phone]  Fax:  [phone]                                    Ulises Greene   MRN: [id number]    Department:  [facility]   Date of Visit:  4/25/2019           After Visit Summary Signature Page    I have received my discharge instructions, and my questions have been answered. I have discussed any challenges I see with this plan with the nurse or doctor.    ..........................................................................................................................................  Patient/Patient Representative Signature      ..........................................................................................................................................  Patient Representative Print Name and Relationship to Patient    ..................................................               ................................................  Date                                   Time    ..........................................................................................................................................  Reviewed by Signature/Title    ...................................................              ..............................................  Date                                               Time          22EPIC Rev 08/18

## 2019-05-18 ENCOUNTER — OFFICE VISIT (OUTPATIENT)
Dept: FAMILY MEDICINE | Facility: OTHER | Age: 39
End: 2019-05-18
Attending: NURSE PRACTITIONER
Payer: COMMERCIAL

## 2019-05-18 VITALS
BODY MASS INDEX: 29.76 KG/M2 | HEIGHT: 69 IN | WEIGHT: 200.9 LBS | SYSTOLIC BLOOD PRESSURE: 120 MMHG | RESPIRATION RATE: 16 BRPM | DIASTOLIC BLOOD PRESSURE: 70 MMHG | OXYGEN SATURATION: 98 % | TEMPERATURE: 97.6 F | HEART RATE: 79 BPM

## 2019-05-18 DIAGNOSIS — H61.21 IMPACTED CERUMEN OF RIGHT EAR: ICD-10-CM

## 2019-05-18 DIAGNOSIS — H92.01 ACUTE PAIN OF RIGHT EAR: ICD-10-CM

## 2019-05-18 DIAGNOSIS — H60.391 INFECTION OF RIGHT EAR CANAL: Primary | ICD-10-CM

## 2019-05-18 PROCEDURE — 99213 OFFICE O/P EST LOW 20 MIN: CPT | Performed by: NURSE PRACTITIONER

## 2019-05-18 RX ORDER — NEOMYCIN SULFATE, POLYMYXIN B SULFATE, HYDROCORTISONE 3.5; 10000; 1 MG/ML; [USP'U]/ML; MG/ML
3 SOLUTION/ DROPS AURICULAR (OTIC) 3 TIMES DAILY
Qty: 4 ML | Refills: 0 | Status: SHIPPED | OUTPATIENT
Start: 2019-05-18 | End: 2019-10-18

## 2019-05-18 SDOH — HEALTH STABILITY: MENTAL HEALTH: HOW OFTEN DO YOU HAVE A DRINK CONTAINING ALCOHOL?: MONTHLY OR LESS

## 2019-05-18 ASSESSMENT — PAIN SCALES - GENERAL: PAINLEVEL: SEVERE PAIN (6)

## 2019-05-18 ASSESSMENT — MIFFLIN-ST. JEOR: SCORE: 1811.66

## 2019-05-18 NOTE — NURSING NOTE
Chief Complaint   Patient presents with     Ear Problem       Medication Reconciliation: complete   EAR PAIN  Onset: thursday  Location:  right  Fever:  no  Recent URI:  no  Discharge:  blood  Able to sleep:  yes  Pain Scale:  6  Alena Patricia LPN .............5/18/2019  4:41 PM

## 2019-05-18 NOTE — PROGRESS NOTES
HPI:    Ulises Greene is a 38 year old male  who presents to clinic today for ear pain.    Started with right jaw and right ear pain 2 days ago.  Jaw pain resolved.  Right ear pain continues.  Today noted some blood in the right ear canal on a cotton ball.  Denies placing any Qtip or sharp objects in his ear canals.  No nasal drainage or congestion.  Denies any swimming or pressure.  No headaches. No dizziness.  No ringing or buzzing in ears.  No fevers.  No dental pain, just recently had dental cleaning without concerns.        No past medical history on file.  Past Surgical History:   Procedure Laterality Date     EXTRACTION(S) DENTAL       LAPAROSCOPIC CHOLECYSTECTOMY  2014     OPEN REDUCTION INTERNAL FIXATION CALCANEOUS Left     Had 3 surgeries     Spinal cord stimulator      Implanted and then removed about a year later     Social History     Tobacco Use     Smoking status: Former Smoker     Packs/day: 0.75     Types: Cigarettes     Last attempt to quit: 2018     Years since quittin.1     Smokeless tobacco: Never Used   Substance Use Topics     Alcohol use: Yes     Alcohol/week: 0.5 oz     Frequency: Monthly or less     Current Outpatient Medications   Medication Sig Dispense Refill     clonazePAM (KLONOPIN) 0.5 MG tablet Take 0.5 mg by mouth nightly as needed for anxiety       LYRICA 200 MG capsule Take 1 capsule by mouth 2 times daily       medical cannabis (Patient's own supply) See Admin Instructions (The purpose of this order is to document that the patient reports taking medical cannabis.  This is not a prescription, and is not used to certify that the patient has a qualifying medical condition.)       traZODone (DESYREL) 50 MG tablet Take 100 mg by mouth nightly as needed  90 tablet      Allergies   Allergen Reactions     Gabapentin      Other reaction(s): Tremors     Methadone      Other reaction(s): Sleep Disturbances     Tramadol Other (See Comments)     tremors         Past  "medical history, past surgical history, current medications and allergies reviewed and accurate to the best of my knowledge.        ROS:  Refer to HPI    /70 (BP Location: Right arm, Patient Position: Sitting, Cuff Size: Adult Regular)   Pulse 79   Temp 97.6  F (36.4  C) (Tympanic)   Resp 16   Ht 1.753 m (5' 9\")   Wt 91.1 kg (200 lb 14.4 oz)   SpO2 98%   BMI 29.67 kg/m      EXAM:  General Appearance: Well appearing adult male, appropriate appearance for age. No acute distress  Head: normocephalic, atraumatic  Ears: Left TM intact, translucent with bony landmarks appreciated, no erythema, no effusion, no bulging, no purulence.  Right TM initially obscured by excessive impacted ear wax, ear flush completed by nurse without complication, Right TM intact, translucent with bony landmarks appreciated, no erythema, no effusion, no bulging, no purulence.  Left auditory canal clear.  Right auditory canal with tender pustule with mild surrounding swelling and crusting, no bleeding or drainage.  Normal external ears, non tender.  Eyes: conjunctivae normal without erythema or irritation, no drainage or crusting, no eyelid swelling, pupils equal   Orophayrnx: moist mucous membranes, posterior pharynx without erythema, tonsils without hypertrophy, no erythema, no exudates or petechiae, no post nasal drip seen, no trismus, voice clear.  No swelling or palpable tenderness over the parotid area.  Teeth intact, no surrounding erythema or swelling or gingiva.    Sinuses:  No sinus tenderness upon palpation of the frontal or maxillary sinuses  Nose: no drainage or congestion   Neck: supple without adenopathy  Respiratory: normal chest wall and respirations.  Normal effort.  Clear to auscultation bilaterally, no wheezing, crackles or rhonchi.  No increased work of breathing.  No cough appreciated, oxygen saturation 98%  Cardiac: RRR with no murmurs  Musculoskeletal:  Normal gait.  Equal movement of bilateral upper " extremities.  Equal movement of bilateral lower extremities.    Psychological: normal affect, alert and pleasant          ASSESSMENT/PLAN:  1. Acute pain of right ear    Tylenol or ibuprofen PRN    2. Infection of right ear canal    - neomycin-polymyxin-hydrocortisone (CORTISPORIN) 3.5-57801-1 otic solution; Place 3 drops into the right ear 3 times daily for 7 days  Dispense: 4 mL; Refill: 0    Wash external ear canal daily PRN with peroxide soaked Qtip    Discussed warning signs/symptoms indicative of need to f/u    Follow up if symptoms persist or worsen or concerns    3. Impacted cerumen of right ear    - HC REMOVAL IMPACTED CERUMEN IRRIGATION/LVG UNILAT

## 2019-05-18 NOTE — NURSING NOTE
The canal was irrigated with body-temperature tap water with the jet of water directed superiorly.  The ear canal was then re-examined and cleared of the impaction.  The patient tolerated the procedure well.   Alena Patricia LPN .............5/18/2019  5:20 PM

## 2019-09-23 ENCOUNTER — TELEPHONE (OUTPATIENT)
Dept: FAMILY MEDICINE | Facility: OTHER | Age: 39
End: 2019-09-23

## 2019-09-23 DIAGNOSIS — F17.200 READY TO QUIT SMOKING: Primary | ICD-10-CM

## 2019-09-23 NOTE — TELEPHONE ENCOUNTER
Patient states he started smoking again and would like another prescription for Chantix    Please submit to [facility], [place] and/or call to advise    Thank you

## 2019-10-18 ENCOUNTER — OFFICE VISIT (OUTPATIENT)
Dept: FAMILY MEDICINE | Facility: OTHER | Age: 39
End: 2019-10-18
Attending: NURSE PRACTITIONER
Payer: OTHER MISCELLANEOUS

## 2019-10-18 VITALS
SYSTOLIC BLOOD PRESSURE: 110 MMHG | HEIGHT: 69 IN | HEART RATE: 82 BPM | TEMPERATURE: 96.6 F | WEIGHT: 191 LBS | DIASTOLIC BLOOD PRESSURE: 64 MMHG | BODY MASS INDEX: 28.29 KG/M2 | RESPIRATION RATE: 18 BRPM

## 2019-10-18 DIAGNOSIS — Z01.818 PREOP GENERAL PHYSICAL EXAM: Primary | ICD-10-CM

## 2019-10-18 PROCEDURE — 99214 OFFICE O/P EST MOD 30 MIN: CPT | Performed by: NURSE PRACTITIONER

## 2019-10-18 ASSESSMENT — PAIN SCALES - GENERAL: PAINLEVEL: MODERATE PAIN (5)

## 2019-10-18 ASSESSMENT — MIFFLIN-ST. JEOR: SCORE: 1766.75

## 2019-10-18 NOTE — PROGRESS NOTES
[facility]  [address]  [place] [address] [phone]  Dept: [phone]    PRE-OP EVALUATION:  Today's date: 10/18/2019    Ulises Greene (: 1980) presents for pre-operative evaluation assessment as requested by Dr. Ricci.  He requires evaluation and anesthesia risk assessment prior to undergoing surgery/procedure for treatment of left foot .    Proposed Surgery/ Procedure: Nerve stimulator Left leg  Date of Surgery/ Procedure: 10/25/19  Time of Surgery/ Procedure: 9 a.m.  Hospital/Surgical Facility: [facility]  Fax number for surgical facility: [phone]  Primary Physician: Elizabeth Corrigan  Type of Anesthesia Anticipated: to be determined    Patient has a Health Care Directive or Living Will:  NO    1. NO - Do you have a history of heart attack, stroke, stent, bypass or surgery on an artery in the head, neck, heart or legs?  2. NO - Do you ever have any pain or discomfort in your chest?  3. NO - Do you have a history of  Heart Failure?  4. NO - Are you troubled by shortness of breath when: walking on the level, up a slight hill or at night?  5. NO - Do you currently have a cold, bronchitis or other respiratory infection?  6. NO - Do you have a cough, shortness of breath or wheezing?  7. NO - Do you sometimes get pains in the calves of your legs when you walk?  8. NO - Do you or anyone in your family have previous history of blood clots?  9. NO - Do you or does anyone in your family have a serious bleeding problem such as prolonged bleeding following surgeries or cuts?  10. NO - Have you ever had problems with anemia or been told to take iron pills?  11. NO - Have you had any abnormal blood loss such as black, tarry or bloody stools, or abnormal vaginal bleeding?  12. NO - Have you ever had a blood transfusion?  13. NO - Have you or any of your relatives ever had problems with anesthesia?  14. YES - Do you have  sleep apnea, excessive snoring or daytime drowsiness? Snoring   15. NO - Do you have any prosthetic heart valves?  16. NO - Do you have prosthetic joints?  17. NO - Is there any chance that you may be pregnant?      HPI:     HPI related to upcoming procedure: R chronic foot pain. Was in a trial for nerve stimulator. Found it to be effective. It will not be permanently inserted.       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 No pertinent past medical history.  Past Surgical History:   Procedure Laterality Date     EXTRACTION(S) DENTAL       LAPAROSCOPIC CHOLECYSTECTOMY  06/26/2014     OPEN REDUCTION INTERNAL FIXATION CALCANEOUS Left 2008    Had 3 surgeries     Spinal cord stimulator  2012    Implanted and then removed about a year later     Current Outpatient Medications   Medication Sig Dispense Refill     clonazePAM (KLONOPIN) 0.5 MG tablet Take 0.5 mg by mouth nightly as needed for anxiety       LYRICA 200 MG capsule Take 1 capsule by mouth 2 times daily       medical cannabis (Patient's own supply) See Admin Instructions (The purpose of this order is to document that the patient reports taking medical cannabis.  This is not a prescription, and is not used to certify that the patient has a qualifying medical condition.)       traZODone (DESYREL) 50 MG tablet Take 100 mg by mouth nightly as needed  90 tablet   "    varenicline (CHANTIX NASIM) 0.5 MG X 11 & 1 MG X 42 tablet Take 0.5 mg tab daily for 3 days, THEN 0.5 mg tab twice daily for 4 days, THEN 1 mg twice daily. 53 tablet 0     OTC products: None, except as noted above    Allergies   Allergen Reactions     Gabapentin      Other reaction(s): Tremors     Methadone      Other reaction(s): Sleep Disturbances     Tramadol Other (See Comments)     tremors      Latex Allergy: NO    Social History     Tobacco Use     Smoking status: Former Smoker     Packs/day: 0.75     Types: Cigarettes     Last attempt to quit: 2018     Years since quittin.5     Smokeless tobacco: Never Used   Substance Use Topics     Alcohol use: Yes     Alcohol/week: 0.8 standard drinks     Frequency: Monthly or less     History   Drug Use     Types: Marijuana     Comment: medical     REVIEW OF SYSTEMS:   Constitutional, neuro, ENT, endocrine, pulmonary, cardiac, gastrointestinal, genitourinary, musculoskeletal, integument and psychiatric systems are negative, except as otherwise noted.    EXAM:   /64 (BP Location: Right arm, Patient Position: Sitting, Cuff Size: Adult Large)   Pulse 82   Temp 96.6  F (35.9  C) (Tympanic)   Resp 18   Ht 1.753 m (5' 9\")   Wt 86.6 kg (191 lb)   PF 98 L/min   BMI 28.21 kg/m      GENERAL APPEARANCE: healthy, alert and no distress     EYES: EOMI,  PERRL     HENT: ear canals and TM's normal and nose and mouth without ulcers or lesions     NECK: no adenopathy, no asymmetry, masses, or scars and thyroid normal to palpation     RESP: lungs clear to auscultation - no rales, rhonchi or wheezes     CV: regular rates and rhythm, normal S1 S2, no S3 or S4 and no murmur, click or rub     ABDOMEN:  soft, nontender, no HSM or masses and bowel sounds normal     MS: extremities normal- no gross deformities noted, no evidence of inflammation in joints, FROM in all extremities. L foot noted muscle atrophy. Decreased sensation chronic. Good cap refill. Pulses intact. No " wounds present.      SKIN: no suspicious lesions or rashes     NEURO: Normal strength and tone, sensory exam grossly normal, mentation intact and speech normal     PSYCH: mentation appears normal. and affect normal/bright     LYMPHATICS: No cervical adenopathy    DIAGNOSTICS:   No labs or EKG required for low risk surgery (cataract, skin procedure, breast biopsy, etc)  Discussed this with surgery they do no require further lab testing.   Recent Labs   Lab Test 02/13/18  0909 07/07/17  1759 06/08/17  1522  06/23/14  0128  06/21/14  1423   HGB  --  14.0 16.3   < >  --    < >  --    PLT  --  207 240   < >  --    < >  --    INR  --   --   --   --   --   --  1.0     --   --   --  134*   < >  --    POTASSIUM 4.1  --   --   --  3.7   < >  --    CR 0.96  --   --   --  0.70*   < >  --     < > = values in this interval not displayed.     IMPRESSION:   Reason for surgery/procedure: L peripheral Nerve stimulator  Diagnosis/reason for consult: Pre-procedure, chronic pain, current smoker     The proposed surgical procedure is considered LOW risk.    REVISED CARDIAC RISK INDEX  The patient has the following serious cardiovascular risks for perioperative complications such as (MI, PE, VFib and 3  AV Block):  No serious cardiac risks  INTERPRETATION: 0 risks: Class I (very low risk - 0.4% complication rate)    The patient has the following additional risks for perioperative complications:  No identified additional risks      ICD-10-CM    1. Preop general physical exam Z01.818 CANCELED: Basic Metabolic Panel     CANCELED: CBC W PLT No Diff       RECOMMENDATIONS:   --Patient is on minimal medication and will hold medications the morning of the procedure. He is on medical cannabis-vaping.     APPROVAL GIVEN to proceed with proposed procedure, without further diagnostic evaluation     Patient reports snoring. Encouraged sleep study in the future. Encouraged smoking cessation. Currently trying Chantix.     Signed Electronically by:  Angy Lundy NP    Copy of this evaluation report is provided to requesting physician.    [place] Preop Guidelines    Revised Cardiac Risk Index

## 2019-10-18 NOTE — NURSING NOTE
Patient presents to clinic for pre op exam.  Surgery with Dr. Ricci at [facility] in [place] on 10/25/19.  This is for placement of Left leg nerve stimulator.    Medication Reconciliation: complete    Blanche Bueno LPN

## 2020-06-02 ENCOUNTER — HOSPITAL ENCOUNTER (EMERGENCY)
Facility: OTHER | Age: 40
Discharge: HOME | End: 2020-06-02
Attending: PHYSICIAN ASSISTANT | Admitting: PHYSICIAN ASSISTANT
Payer: COMMERCIAL

## 2020-06-02 ENCOUNTER — APPOINTMENT (OUTPATIENT)
Dept: CT IMAGING | Facility: OTHER | Age: 40
End: 2020-06-02
Attending: PHYSICIAN ASSISTANT
Payer: COMMERCIAL

## 2020-06-02 ENCOUNTER — APPOINTMENT (OUTPATIENT)
Dept: GENERAL RADIOLOGY | Facility: OTHER | Age: 40
End: 2020-06-02
Attending: PHYSICIAN ASSISTANT
Payer: COMMERCIAL

## 2020-06-02 VITALS
RESPIRATION RATE: 14 BRPM | HEART RATE: 82 BPM | WEIGHT: 192 LBS | SYSTOLIC BLOOD PRESSURE: 103 MMHG | DIASTOLIC BLOOD PRESSURE: 76 MMHG | BODY MASS INDEX: 28.44 KG/M2 | HEIGHT: 69 IN | TEMPERATURE: 97.4 F | OXYGEN SATURATION: 98 %

## 2020-06-02 DIAGNOSIS — R59.0 HILAR LYMPHADENOPATHY: ICD-10-CM

## 2020-06-02 DIAGNOSIS — K21.9 GASTROESOPHAGEAL REFLUX DISEASE: ICD-10-CM

## 2020-06-02 DIAGNOSIS — R07.89 ATYPICAL CHEST PAIN: ICD-10-CM

## 2020-06-02 LAB
ALBUMIN SERPL-MCNC: 4.8 G/DL (ref 3.5–5.7)
ALBUMIN UR-MCNC: NEGATIVE MG/DL
ALP SERPL-CCNC: 68 U/L (ref 34–104)
ALT SERPL W P-5'-P-CCNC: 31 U/L (ref 7–52)
ANION GAP SERPL CALCULATED.3IONS-SCNC: 8 MMOL/L (ref 3–14)
APPEARANCE UR: CLEAR
AST SERPL W P-5'-P-CCNC: 24 U/L (ref 13–39)
BASOPHILS # BLD AUTO: 0.1 10E9/L (ref 0–0.2)
BASOPHILS NFR BLD AUTO: 0.6 %
BILIRUB SERPL-MCNC: 0.4 MG/DL (ref 0.3–1)
BILIRUB UR QL STRIP: NEGATIVE
BUN SERPL-MCNC: 8 MG/DL (ref 7–25)
CALCIUM SERPL-MCNC: 9.6 MG/DL (ref 8.6–10.3)
CHLORIDE SERPL-SCNC: 103 MMOL/L (ref 98–107)
CO2 SERPL-SCNC: 26 MMOL/L (ref 21–31)
COLOR UR AUTO: NORMAL
CREAT SERPL-MCNC: 1.11 MG/DL (ref 0.7–1.3)
D DIMER PPP DDU-MCNC: <200 NG/ML D-DU (ref 0–230)
DIFFERENTIAL METHOD BLD: NORMAL
EOSINOPHIL # BLD AUTO: 0.2 10E9/L (ref 0–0.7)
EOSINOPHIL NFR BLD AUTO: 2 %
ERYTHROCYTE [DISTWIDTH] IN BLOOD BY AUTOMATED COUNT: 12.4 % (ref 10–15)
GFR SERPL CREATININE-BSD FRML MDRD: 73 ML/MIN/{1.73_M2}
GLUCOSE SERPL-MCNC: 108 MG/DL (ref 70–105)
GLUCOSE UR STRIP-MCNC: NEGATIVE MG/DL
HCT VFR BLD AUTO: 46 % (ref 40–53)
HGB BLD-MCNC: 16.8 G/DL (ref 13.3–17.7)
HGB UR QL STRIP: NEGATIVE
IMM GRANULOCYTES # BLD: 0.1 10E9/L (ref 0–0.4)
IMM GRANULOCYTES NFR BLD: 0.6 %
INR PPP: 0.95 (ref 0–1.3)
KETONES UR STRIP-MCNC: NEGATIVE MG/DL
LEUKOCYTE ESTERASE UR QL STRIP: NEGATIVE
LIPASE SERPL-CCNC: 82 U/L (ref 11–82)
LYMPHOCYTES # BLD AUTO: 3.1 10E9/L (ref 0.8–5.3)
LYMPHOCYTES NFR BLD AUTO: 27.9 %
MAGNESIUM SERPL-MCNC: 2.2 MG/DL (ref 1.9–2.7)
MCH RBC QN AUTO: 32.9 PG (ref 26.5–33)
MCHC RBC AUTO-ENTMCNC: 36.5 G/DL (ref 31.5–36.5)
MCV RBC AUTO: 90 FL (ref 78–100)
MONOCYTES # BLD AUTO: 0.9 10E9/L (ref 0–1.3)
MONOCYTES NFR BLD AUTO: 8.2 %
NEUTROPHILS # BLD AUTO: 6.6 10E9/L (ref 1.6–8.3)
NEUTROPHILS NFR BLD AUTO: 60.7 %
NITRATE UR QL: NEGATIVE
NT-PROBNP SERPL-MCNC: 37 PG/ML (ref 0–100)
PH UR STRIP: 5 PH (ref 5–7)
PLATELET # BLD AUTO: 239 10E9/L (ref 150–450)
POTASSIUM SERPL-SCNC: 3.8 MMOL/L (ref 3.5–5.1)
PROT SERPL-MCNC: 7.2 G/DL (ref 6.4–8.9)
RBC # BLD AUTO: 5.1 10E12/L (ref 4.4–5.9)
SODIUM SERPL-SCNC: 137 MMOL/L (ref 134–144)
SOURCE: NORMAL
SP GR UR STRIP: 1.01 (ref 1–1.03)
TROPONIN I SERPL-MCNC: 2.7 PG/ML
TROPONIN I SERPL-MCNC: 3.3 PG/ML
UROBILINOGEN UR STRIP-MCNC: NORMAL MG/DL (ref 0–2)
WBC # BLD AUTO: 11 10E9/L (ref 4–11)

## 2020-06-02 PROCEDURE — 25500064 ZZH RX 255 OP 636: Performed by: PHYSICIAN ASSISTANT

## 2020-06-02 PROCEDURE — 85379 FIBRIN DEGRADATION QUANT: CPT | Performed by: PHYSICIAN ASSISTANT

## 2020-06-02 PROCEDURE — 99284 EMERGENCY DEPT VISIT MOD MDM: CPT | Mod: Z6 | Performed by: PHYSICIAN ASSISTANT

## 2020-06-02 PROCEDURE — 96365 THER/PROPH/DIAG IV INF INIT: CPT | Performed by: PHYSICIAN ASSISTANT

## 2020-06-02 PROCEDURE — 25000128 H RX IP 250 OP 636: Performed by: PHYSICIAN ASSISTANT

## 2020-06-02 PROCEDURE — 93010 ELECTROCARDIOGRAM REPORT: CPT | Performed by: INTERNAL MEDICINE

## 2020-06-02 PROCEDURE — 81003 URINALYSIS AUTO W/O SCOPE: CPT | Performed by: PHYSICIAN ASSISTANT

## 2020-06-02 PROCEDURE — 85025 COMPLETE CBC W/AUTO DIFF WBC: CPT | Performed by: PHYSICIAN ASSISTANT

## 2020-06-02 PROCEDURE — 25800030 ZZH RX IP 258 OP 636: Performed by: PHYSICIAN ASSISTANT

## 2020-06-02 PROCEDURE — 83735 ASSAY OF MAGNESIUM: CPT | Performed by: PHYSICIAN ASSISTANT

## 2020-06-02 PROCEDURE — 93005 ELECTROCARDIOGRAM TRACING: CPT | Performed by: PHYSICIAN ASSISTANT

## 2020-06-02 PROCEDURE — 80053 COMPREHEN METABOLIC PANEL: CPT | Performed by: PHYSICIAN ASSISTANT

## 2020-06-02 PROCEDURE — 71275 CT ANGIOGRAPHY CHEST: CPT

## 2020-06-02 PROCEDURE — 83690 ASSAY OF LIPASE: CPT | Performed by: PHYSICIAN ASSISTANT

## 2020-06-02 PROCEDURE — 36415 COLL VENOUS BLD VENIPUNCTURE: CPT | Performed by: PHYSICIAN ASSISTANT

## 2020-06-02 PROCEDURE — 85610 PROTHROMBIN TIME: CPT | Performed by: PHYSICIAN ASSISTANT

## 2020-06-02 PROCEDURE — 25000132 ZZH RX MED GY IP 250 OP 250 PS 637: Performed by: PHYSICIAN ASSISTANT

## 2020-06-02 PROCEDURE — 99285 EMERGENCY DEPT VISIT HI MDM: CPT | Mod: 25 | Performed by: PHYSICIAN ASSISTANT

## 2020-06-02 PROCEDURE — 71045 X-RAY EXAM CHEST 1 VIEW: CPT

## 2020-06-02 PROCEDURE — 84484 ASSAY OF TROPONIN QUANT: CPT | Performed by: PHYSICIAN ASSISTANT

## 2020-06-02 PROCEDURE — 83880 ASSAY OF NATRIURETIC PEPTIDE: CPT | Performed by: PHYSICIAN ASSISTANT

## 2020-06-02 RX ORDER — LEVOFLOXACIN 500 MG/1
500 TABLET, FILM COATED ORAL DAILY
Qty: 7 TABLET | Refills: 0 | Status: SHIPPED | OUTPATIENT
Start: 2020-06-02 | End: 2020-07-28

## 2020-06-02 RX ORDER — ASPIRIN 81 MG/1
324 TABLET, CHEWABLE ORAL ONCE
Status: COMPLETED | OUTPATIENT
Start: 2020-06-02 | End: 2020-06-02

## 2020-06-02 RX ORDER — SODIUM CHLORIDE 9 MG/ML
1000 INJECTION, SOLUTION INTRAVENOUS CONTINUOUS
Status: DISCONTINUED | OUTPATIENT
Start: 2020-06-02 | End: 2020-06-02 | Stop reason: HOSPADM

## 2020-06-02 RX ORDER — NITROGLYCERIN 0.4 MG/1
0.4 TABLET SUBLINGUAL EVERY 5 MIN PRN
Status: DISCONTINUED | OUTPATIENT
Start: 2020-06-02 | End: 2020-06-02 | Stop reason: HOSPADM

## 2020-06-02 RX ADMIN — FAMOTIDINE 20 MG: 20 INJECTION, SOLUTION INTRAVENOUS at 14:06

## 2020-06-02 RX ADMIN — NITROGLYCERIN 0.4 MG: 0.4 TABLET SUBLINGUAL at 14:02

## 2020-06-02 RX ADMIN — SODIUM CHLORIDE 1000 ML: 9 INJECTION, SOLUTION INTRAVENOUS at 13:59

## 2020-06-02 RX ADMIN — IOHEXOL 100 ML: 350 INJECTION, SOLUTION INTRAVENOUS at 14:53

## 2020-06-02 RX ADMIN — NITROGLYCERIN 0.4 MG: 0.4 TABLET SUBLINGUAL at 14:11

## 2020-06-02 RX ADMIN — ASPIRIN 81 MG 324 MG: 81 TABLET ORAL at 13:58

## 2020-06-02 ASSESSMENT — ENCOUNTER SYMPTOMS
SEIZURES: 0
BRUISES/BLEEDS EASILY: 0
FATIGUE: 0
LIGHT-HEADEDNESS: 0
FACIAL SWELLING: 0
EYE PAIN: 0
CHILLS: 0
FREQUENCY: 0
ADENOPATHY: 0
ACTIVITY CHANGE: 0
APPETITE CHANGE: 0
SINUS PRESSURE: 0
DYSURIA: 0
SORE THROAT: 0
COUGH: 0
NECK PAIN: 0
VOMITING: 0
BACK PAIN: 0
FEVER: 0
TROUBLE SWALLOWING: 0
HEMATURIA: 0
ABDOMINAL PAIN: 0
NAUSEA: 0
DIARRHEA: 0
SHORTNESS OF BREATH: 0
VOICE CHANGE: 0
CONFUSION: 0
WOUND: 0
DIZZINESS: 0
WEAKNESS: 0
CHEST TIGHTNESS: 0
HEADACHES: 0

## 2020-06-02 ASSESSMENT — MIFFLIN-ST. JEOR: SCORE: 1766.29

## 2020-06-02 NOTE — ED AVS SNAPSHOT
[facility]  [address]  [place] [address]  Phone:  [phone]  Fax:  [phone]                                    Ulises Greene   MRN: [id number]    Department:  [facility]   Date of Visit:  6/2/2020           After Visit Summary Signature Page    I have received my discharge instructions, and my questions have been answered. I have discussed any challenges I see with this plan with the nurse or doctor.    ..........................................................................................................................................  Patient/Patient Representative Signature      ..........................................................................................................................................  Patient Representative Print Name and Relationship to Patient    ..................................................               ................................................  Date                                   Time    ..........................................................................................................................................  Reviewed by Signature/Title    ...................................................              ..............................................  Date                                               Time          22EPIC Rev 08/18

## 2020-06-02 NOTE — ED PROVIDER NOTES
History     Chief Complaint   Patient presents with     Chest Pain     Back Pain     HPI  Ulises Greene is a 39 year old male who reports a sudden onset of chest pain approximately 4 and half hours ago while driving to work.  He reports the pain started in his chest began to radiate to his shoulders and back area.  He reports some sharp and shooting pain in his chest area and tenderness to palpation over his sternum.  He reports minimal dyspnea with exertion.  Increased pain with deep inspiration.  Denies any lightheadedness or dizziness.  No nausea or vomiting.  No sore throat, cough, or flulike symptoms.  Denies any previous cardiac history.    Allergies:  Allergies   Allergen Reactions     Gabapentin      Other reaction(s): Tremors     Methadone      Other reaction(s): Sleep Disturbances     Tramadol Other (See Comments)     tremors       Problem List:    Patient Active Problem List    Diagnosis Date Noted     Former smoker 05/17/2018     Priority: Medium     Complex regional pain syndrome type 2 of left lower extremity 10/24/2017     Priority: Medium     Left foot pain 12/23/2016     Priority: Medium     Onychomycosis 02/01/2016     Priority: Medium     Diastasis recti 08/10/2015     Priority: Medium     Hypertriglyceridemia 06/13/2014     Priority: Medium     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     Priority: Medium     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 No pertinent past medical history.    Past Surgical History:    Past Surgical History:   Procedure Laterality Date     EXTRACTION(S) DENTAL       LAPAROSCOPIC CHOLECYSTECTOMY  06/26/2014     OPEN REDUCTION INTERNAL FIXATION CALCANEOUS Left 2008    Had 3 surgeries     Spinal cord stimulator  2012    Implanted and then removed about a year later       Family History:    History reviewed. No pertinent family  "history.    Social History:  Marital Status:   [2]  Social History     Tobacco Use     Smoking status: Former Smoker     Packs/day: 0.50     Types: Cigarettes     Last attempt to quit: 2018     Years since quittin.1     Smokeless tobacco: Never Used   Substance Use Topics     Alcohol use: Yes     Alcohol/week: 0.8 standard drinks     Frequency: Monthly or less     Drug use: Yes     Types: Marijuana     Comment: medical        Medications:    clonazePAM (KLONOPIN) 0.5 MG tablet  LYRICA 200 MG capsule  medical cannabis (Patient's own supply)  traZODone (DESYREL) 50 MG tablet  varenicline (CHANTIX NASIM) 0.5 MG X 11 & 1 MG X 42 tablet          Review of Systems   Constitutional: Negative for activity change, appetite change, chills, fatigue and fever.   HENT: Negative for congestion, facial swelling, sinus pressure, sore throat, trouble swallowing and voice change.    Eyes: Negative for pain and visual disturbance.   Respiratory: Negative for cough, chest tightness and shortness of breath.    Cardiovascular: Positive for chest pain.        Palpable sternal pain   Gastrointestinal: Negative for abdominal pain, diarrhea, nausea and vomiting.   Genitourinary: Negative for dysuria, frequency, hematuria and urgency.   Musculoskeletal: Negative for back pain and neck pain.   Skin: Negative for rash and wound.   Neurological: Negative for dizziness, seizures, syncope, weakness, light-headedness and headaches.   Hematological: Negative for adenopathy. Does not bruise/bleed easily.   Psychiatric/Behavioral: Negative for confusion.       Physical Exam   BP: 126/86  Heart Rate: 93  Temp: 97.5  F (36.4  C)  Resp: 18  Height: 175.3 cm (5' 9\")  Weight: 87.1 kg (192 lb)  SpO2: 98 %      Physical Exam  Constitutional:       General: He is not in acute distress.     Appearance: He is not ill-appearing, toxic-appearing or diaphoretic.   HENT:      Head: Atraumatic.      Right Ear: Tympanic membrane normal. No drainage or " tenderness.      Left Ear: Tympanic membrane normal. No drainage or tenderness.      Nose: Nose normal. No rhinorrhea.   Eyes:      General: No scleral icterus.     Extraocular Movements: Extraocular movements intact.      Right eye: Normal extraocular motion and no nystagmus.      Left eye: Normal extraocular motion and no nystagmus.      Pupils: Pupils are equal, round, and reactive to light. Pupils are equal.      Funduscopic exam:     Right eye: No AV nicking or papilledema. Red reflex present.         Left eye: No AV nicking or papilledema. Red reflex present.  Neck:      Musculoskeletal: Normal range of motion. No neck rigidity, pain with movement or muscular tenderness.      Vascular: No JVD.      Trachea: No tracheal deviation.   Cardiovascular:      Rate and Rhythm: Normal rate and regular rhythm.      Heart sounds: Normal heart sounds. No friction rub.   Pulmonary:      Effort: No respiratory distress.      Breath sounds: Normal breath sounds. No stridor.      Comments: Lung sounds clear.  SaO2 is 98% on room air.  He does not appear to be in any respiratory distress.  He does have some palpable tenderness over his sternum.  No crepitus.  No SQ E.  Abdominal:      General: Bowel sounds are normal.      Palpations: Abdomen is soft.      Tenderness: There is no abdominal tenderness. There is no guarding or rebound.   Musculoskeletal: Normal range of motion.         General: No tenderness.   Lymphadenopathy:      Cervical: No cervical adenopathy.      Right cervical: No superficial cervical adenopathy.     Left cervical: No superficial cervical adenopathy.   Skin:     General: Skin is warm.      Capillary Refill: Capillary refill takes less than 2 seconds.      Findings: No rash.   Neurological:      General: No focal deficit present.      Mental Status: He is alert and oriented to person, place, and time.         ED Course     EKG shows normal sinus rhythm with sinus arrhythmia.  Incomplete right bundle  branch block.  Heart rate is 84.  No previous EKG for comparison.    Results for orders placed or performed during the hospital encounter of 06/02/20 (from the past 24 hour(s))   CBC with platelets differential   Result Value Ref Range    WBC 11.0 4.0 - 11.0 10e9/L    RBC Count 5.10 4.4 - 5.9 10e12/L    Hemoglobin 16.8 13.3 - 17.7 g/dL    Hematocrit 46.0 40.0 - 53.0 %    MCV 90 78 - 100 fl    MCH 32.9 26.5 - 33.0 pg    MCHC 36.5 31.5 - 36.5 g/dL    RDW 12.4 10.0 - 15.0 %    Platelet Count 239 150 - 450 10e9/L    Diff Method Automated Method     % Neutrophils 60.7 %    % Lymphocytes 27.9 %    % Monocytes 8.2 %    % Eosinophils 2.0 %    % Basophils 0.6 %    % Immature Granulocytes 0.6 %    Absolute Neutrophil 6.6 1.6 - 8.3 10e9/L    Absolute Lymphocytes 3.1 0.8 - 5.3 10e9/L    Absolute Monocytes 0.9 0.0 - 1.3 10e9/L    Absolute Eosinophils 0.2 0.0 - 0.7 10e9/L    Absolute Basophils 0.1 0.0 - 0.2 10e9/L    Abs Immature Granulocytes 0.1 0 - 0.4 10e9/L   Troponin GH   Result Value Ref Range    Troponin 3.3 <34.0 pg/mL   INR   Result Value Ref Range    INR 0.95 0 - 1.3   Comprehensive metabolic panel   Result Value Ref Range    Sodium 137 134 - 144 mmol/L    Potassium 3.8 3.5 - 5.1 mmol/L    Chloride 103 98 - 107 mmol/L    Carbon Dioxide 26 21 - 31 mmol/L    Anion Gap 8 3 - 14 mmol/L    Glucose 108 (H) 70 - 105 mg/dL    Urea Nitrogen 8 7 - 25 mg/dL    Creatinine 1.11 0.70 - 1.30 mg/dL    GFR Estimate 73 >60 mL/min/[1.73_m2]    GFR Estimate If Black 88 >60 mL/min/[1.73_m2]    Calcium 9.6 8.6 - 10.3 mg/dL    Bilirubin Total 0.4 0.3 - 1.0 mg/dL    Albumin 4.8 3.5 - 5.7 g/dL    Protein Total 7.2 6.4 - 8.9 g/dL    Alkaline Phosphatase 68 34 - 104 U/L    ALT 31 7 - 52 U/L    AST 24 13 - 39 U/L   Magnesium   Result Value Ref Range    Magnesium 2.2 1.9 - 2.7 mg/dL   Lipase   Result Value Ref Range    Lipase 82 11 - 82 U/L   D-Dimer GH   Result Value Ref Range    D-Dimer ng/mL <200 0 - 230 ng/ml D-DU   Nt probnp inpatient (BNP)    Result Value Ref Range    N-Terminal Pro BNP Inpatient 37 0 - 100 pg/mL   XR Chest Port 1 View    Narrative    PROCEDURE:  XR CHEST PORT 1 VW    HISTORY: chest pain. .    COMPARISON:  None.    FINDINGS:    The cardiomediastinal contours are magnified by portable technique,  with slight right lateral cardiomediastinal prominence.  No focal consolidation, effusion or pneumothorax.      Impression    IMPRESSION:  Question slight prominence of the cardiomediastinal  silhouette along the right inferior margin. Consider follow-up PA and  lateral views.      LANE QUEVEDO MD   UA reflex to Microscopic   Result Value Ref Range    Color Urine Light Yellow     Appearance Urine Clear     Glucose Urine Negative NEG^Negative mg/dL    Bilirubin Urine Negative NEG^Negative    Ketones Urine Negative NEG^Negative mg/dL    Specific Gravity Urine 1.011 1.003 - 1.035    Blood Urine Negative NEG^Negative    pH Urine 5.0 5.0 - 7.0 pH    Protein Albumin Urine Negative NEG^Negative mg/dL    Urobilinogen mg/dL Normal 0.0 - 2.0 mg/dL    Nitrite Urine Negative NEG^Negative    Leukocyte Esterase Urine Negative NEG^Negative    Source Midstream Urine    CTA Chest with Contrast    Narrative    PROCEDURE:  CTA CHEST WITH CONTRAST.    HISTORY:  Evaluate for pulmonary embolism.  Sternal pain with  radiation to his back.    TECHNIQUE:  Initial pre-contrast  and localizer images were  obtained.  Contrast enhanced helical CT angiography was then  performed.  Routine transaxial and post-processed (multiplanar and/or  MIP) reformations were obtained. 3-D aortic postprocessing was  performed on a separate single view workstation reviewed.    COMPARISON:  Chest x-ray earlier today.    MEDS/CONTRAST: 100 ml Omnipaque 350    CTA FINDINGS:  No acute aortic injury is identified. The aorta is  normal in caliber. No periaortic hematoma or stranding is seen. No  aortic aneurysm is present. No acute pulmonary embolus is identified.  The thoracic aorta  and main pulmonary artery are within normal limits  in caliber.    OTHER FINDINGS:      The heart is upper normal in caliber. There is no pericardial  thickening or effusions. Mildly enlarged bilateral hilar nodes are  present without calcification. No mediastinal adenopathy is present.  The thoracic aorta and main pulmonary artery are within normal limits  in size.     Limited views of the upper abdomen reveal no adrenal mass or acute  process.     The pleura is without thickening or effusions. The central airways are  unremarkable. No focal consolidation or intrapulmonary mass is  identified. Slight dependent atelectasis may reflect the phase of  respiration.    No suspicious osseous lesion is identified. No sternal or thoracic  spine fracture is identified.      Impression    IMPRESSION:    No acute aortic injury or aortic aneurysm. No acute pulmonary embolus.    No sternal or spinal fracture.     Mild hilar adenopathy may be reactive. Consider follow-up.    LANE QUEVEDO MD   Troponin GH (now)   Result Value Ref Range    Troponin 2.7 <34.0 pg/mL       Medications   0.9% sodium chloride BOLUS (0 mLs Intravenous Stopped 6/2/20 1600)     Followed by   sodium chloride 0.9% infusion ( Intravenous Canceled Entry 6/2/20 1643)   nitroGLYcerin (NITROSTAT) sublingual tablet 0.4 mg (0.4 mg Sublingual Given 6/2/20 1411)   aspirin (ASA) chewable tablet 324 mg (324 mg Oral Given 6/2/20 1358)   famotidine (PEPCID) infusion 20 mg (0 mg Intravenous Stopped 6/2/20 1426)   iohexol (OMNIPAQUE) 350 mg/mL solution 100 mL (100 mLs Intravenous Given 6/2/20 1453)       Assessments & Plan (with Medical Decision Making)     I have reviewed the nursing notes.    I have reviewed the findings, diagnosis, plan and need for follow up with the patient.      New Prescriptions    LEVOFLOXACIN (LEVAQUIN) 500 MG TABLET    Take 1 tablet (500 mg) by mouth daily for 7 days    OMEPRAZOLE (PRILOSEC) 20 MG DR CAPSULE    Take 1 capsule (20 mg) by  mouth daily       Final diagnoses:   Atypical chest pain   Hilar lymphadenopathy   Gastroesophageal reflux disease   Afebrile.  Vital signs stable.  Patient with sudden onset of midsternal chest pain which is even palpable.  Some radiation to bilateral shoulders and to his back area.  IV established and he was given fluids, aspirin, Pepcid, and nitroglycerin initially.  EKG shows normal sinus rhythm with sinus arrhythmia.  Incomplete right bundle branch block.  Heart rate is 84.  No previous EKG for comparison.  Initial troponin is normal.  INR is normal.  D-dimer is normal.  BNP is normal.  Lipase is normal.  CBC shows normal white blood cells no left shift.  CMP is unremarkable.  His UA returns normal as well.  Chest x-ray shows Question slight prominence of the cardiomediastinal silhouette along the right inferior margin.  Given the patient's symptoms CTA of his chest with contrast was performed and this shows No acute aortic injury or aortic aneurysm. No acute pulmonary embolus.  No sternal or spinal fracture.   Mild hilar adenopathy may be reactive.  The patient reports all of his symptoms have completely resolved with the above treatment.  His 90-minute troponin returns normal as well which is very reassuring.  I did discuss with the patient that he was given medications for GERD and that he may have had esophageal spasming from esophagitis which actually is improved with nitroglycerin since his smooth muscle relaxant.  I discussed the need for further evaluation and cardiac risk stratification.  Given his healer lymphadenopathy we will treat this empirically with Levaquin.  Rx was written as well for Prilosec.  Follow-up was arranged with Dr. Seth gil on 2/4/2020 for further evaluation and cardiac risk stratification.  Follow-up sooner if there is any other concerns problems or questions  6/2/2020   [facility]     Damien Ledezma PA-C  06/02/20 [id number]

## 2020-06-02 NOTE — ED NOTES
Patient readied for discharge.  AVS given and explained.  Patient verbalized understanding of instructions and findings.      Patient discharged to home via personal vehicle.

## 2020-06-02 NOTE — ED TRIAGE NOTES
"ED Nursing Triage Note (General)   ________________________________    Ulisespaul Greene is a 39 year old Male that presents to triage private car  With history of  Chest pain onset at 0930 when driving to work; sudden onset  With pain worsening over the next few hours.  Started in his chest then began to radiate to his shoulders and back.  Sharp and shooting in his chest and dull in his back reported by patient.  Worse with palpation and inspiration.  Some SOB with it.  No diaphoresis or nausea   Significant symptoms had onset 4 hour(s) ago.  /86   Temp 97.5  F (36.4  C) (Tympanic)   Resp 18   Ht 1.753 m (5' 9\")   Wt 87.1 kg (192 lb)   SpO2 98%   BMI 28.35 kg/m  t  Patient appears alert , in moderate distress., and cooperative behavior.    GCS Total = 15  Airway: intact  Breathing noted as Normal.  Circulation Normal  Skin normal  Action taken:  Triage to critical care immediately      PRE HOSPITAL PRIOR LIVING SITUATION unknown  "

## 2020-06-02 NOTE — ED NOTES
Patient resting comfortably on the bed.  Patient states his pain is gone unless he presses on his chest, mid-sternal at approx 3rd intercostal.  Patient states pain in shoulder and back are gone.

## 2020-06-03 LAB — INTERPRETATION ECG - MUSE: NORMAL

## 2020-06-04 ENCOUNTER — OFFICE VISIT (OUTPATIENT)
Dept: FAMILY MEDICINE | Facility: OTHER | Age: 40
End: 2020-06-04
Attending: FAMILY MEDICINE
Payer: COMMERCIAL

## 2020-06-04 VITALS
SYSTOLIC BLOOD PRESSURE: 110 MMHG | DIASTOLIC BLOOD PRESSURE: 62 MMHG | BODY MASS INDEX: 28.41 KG/M2 | TEMPERATURE: 98.9 F | HEART RATE: 90 BPM | OXYGEN SATURATION: 96 % | WEIGHT: 192.4 LBS | RESPIRATION RATE: 20 BRPM

## 2020-06-04 DIAGNOSIS — E78.5 DYSLIPIDEMIA: Primary | ICD-10-CM

## 2020-06-04 DIAGNOSIS — R59.0 HILAR ADENOPATHY: ICD-10-CM

## 2020-06-04 DIAGNOSIS — R07.89 CHEST WALL DISCOMFORT: ICD-10-CM

## 2020-06-04 PROCEDURE — 99214 OFFICE O/P EST MOD 30 MIN: CPT | Performed by: FAMILY MEDICINE

## 2020-06-04 ASSESSMENT — PAIN SCALES - GENERAL: PAINLEVEL: SEVERE PAIN (6)

## 2020-06-04 NOTE — PROGRESS NOTES
SUBJECTIVE:   Ulises Greene is a 39 year old male who presents to clinic today for the following health issues:  Nursing Notes:   Kamla Goodman, LPN  6/4/2020  3:35 PM  Sign at exiting of workspace  Patient is here to follow up from ER visit on Tuesday. States was having chest pain, went into his shoulders and down into his back. States is still having pain.        Medication Reconciliation: nathan Cason LÓPEZ YG Goodman  6/4/2020 3:35 PM      HPI  38 yo male presents with atypical chest pain that radiates into back.  Was on his way to work driving, gradual onset, decided to go to ER  EMR reviewed.  EKG and troponins were negative.  Chest x-ray and CT scan did show some mild hilar adenopathy but no other abnormalities were noted.  Patient was given a prescription for Levaquin for unclear reasons.  Also started on PPI.    He currently complains of a mild discomfort in the epigastric or lower chest area.  Does not radiate.  He is sedentary but reports no change in his exercise tolerance.  He is a smoker.  No family history of early onset coronary disease.    Patient is currently on medical cannabis for unknown condition.        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 No pertinent past medical history.   Current Outpatient Medications   Medication Sig Dispense Refill     clonazePAM (KLONOPIN) 0.5 MG tablet Take 0.5 mg by  mouth nightly as needed for anxiety       LYRICA 200 MG capsule Take 1 capsule by mouth 2 times daily       medical cannabis (Patient's own supply) See Admin Instructions (The purpose of this order is to document that the patient reports taking medical cannabis.  This is not a prescription, and is not used to certify that the patient has a qualifying medical condition.)       omeprazole (PRILOSEC) 20 MG DR capsule Take 1 capsule (20 mg) by mouth daily 30 capsule 0     traZODone (DESYREL) 50 MG tablet Take 100 mg by mouth nightly as needed  90 tablet      varenicline (CHANTIX NASIM) 0.5 MG X 11 & 1 MG X 42 tablet Take 0.5 mg tab daily for 3 days, THEN 0.5 mg tab twice daily for 4 days, THEN 1 mg twice daily. 53 tablet 0       Review of Systems   Constitutional: Negative for chills, fatigue and fever.   Respiratory: Positive for chest tightness. Negative for cough, choking, shortness of breath, wheezing and stridor.    Cardiovascular: Positive for chest pain. Negative for palpitations and peripheral edema.   Gastrointestinal: Negative for abdominal pain, diarrhea and heartburn.   Neurological: Negative for dizziness and facial asymmetry.   Psychiatric/Behavioral: Negative for dysphoric mood.        OBJECTIVE:     /62 (BP Location: Right arm, Patient Position: Sitting, Cuff Size: Adult Large)   Pulse 90   Temp 98.9  F (37.2  C) (Tympanic)   Resp 20   Wt 87.3 kg (192 lb 6.4 oz)   SpO2 96%   BMI 28.41 kg/m    Body mass index is 28.41 kg/m .  Physical Exam  HENT:      Mouth/Throat:      Pharynx: Oropharynx is clear.   Neck:      Musculoskeletal: Normal range of motion.   Cardiovascular:      Rate and Rhythm: Normal rate.      Heart sounds: No murmur. No gallop.    Pulmonary:      Effort: Pulmonary effort is normal. No respiratory distress.      Breath sounds: No wheezing.   Abdominal:      General: Abdomen is flat. Bowel sounds are normal. There is no distension.      Tenderness: There is no abdominal  tenderness.   Neurological:      Mental Status: He is alert.         Diagnostic Test Results:  none     ASSESSMENT/PLAN:       1. Dyslipidemia    2. Hilar adenopathy    3. Chest wall discomfort      Chest discomfort is reproducible with palpitation.  Inconsistent with cardiac source of pain.  Suspect he may have a component of acid reflux and esophageal spasm and recommend he continue on PPI.    We will need a repeat chest x-ray due to borderline hilar adenopathy.  Recommend in 4 weeks.    Discussed the role of coronary calcium scan.  He will consider cost of this and get back to us.    Discussed importance of cardiac risk factor control including smoking cessation.  He is given a prescription for Chantix as he has had success with this in the past.    I spent over 25 minutes with the patient, greater than 50% was spent in counseling and coordination of care.      Elizabeth Flores MD  [facility]

## 2020-06-04 NOTE — NURSING NOTE
Patient is here to follow up from ER visit on Tuesday. States was having chest pain, went into his shoulders and down into his back. States is still having pain.        Medication Reconciliation: nathan Goodman LPN  6/4/2020 3:35 PM

## 2020-06-04 NOTE — PATIENT INSTRUCTIONS
Schedule coronary calcium scan    Quit smoking    Stay on prilosec x 4 weeks    Start claritin 10 mg daily x 4 weeks for allergies    Finish antibiotics    Repeat chest x ray in 3-4 weeks

## 2020-06-08 ENCOUNTER — TELEPHONE (OUTPATIENT)
Dept: FAMILY MEDICINE | Facility: OTHER | Age: 40
End: 2020-06-08

## 2020-06-08 NOTE — TELEPHONE ENCOUNTER
Patient called and stated he needs a note to return to work form if you could please contact him thank you

## 2020-06-08 NOTE — TELEPHONE ENCOUNTER
Noted. I will look for the paperwork as I do not have it in my inbox yet.   Nika Lopez PA-C ..................6/8/2020 3:23 PM

## 2020-06-08 NOTE — TELEPHONE ENCOUNTER
Had paper work filled out to return to work, his employer has different forms to have filled out.  He will drop off at [address] soon. Dr. churchill is gone until June 16.  He is to return to work June 9.    Padmini Man LPN ...... 6/8/2020 3:15 PM

## 2020-06-10 ASSESSMENT — ENCOUNTER SYMPTOMS
CHILLS: 0
PALPITATIONS: 0
ABDOMINAL PAIN: 0
DIZZINESS: 0
CHOKING: 0
DYSPHORIC MOOD: 0
DIARRHEA: 0
FATIGUE: 0
FEVER: 0
CHEST TIGHTNESS: 1
STRIDOR: 0
WHEEZING: 0
HEARTBURN: 0
FACIAL ASYMMETRY: 0
COUGH: 0
SHORTNESS OF BREATH: 0

## 2020-06-17 DIAGNOSIS — K21.00 GASTROESOPHAGEAL REFLUX DISEASE WITH ESOPHAGITIS: Primary | ICD-10-CM

## 2020-06-18 ENCOUNTER — HOSPITAL ENCOUNTER (OUTPATIENT)
Dept: CT IMAGING | Facility: OTHER | Age: 40
Discharge: HOME | End: 2020-06-18
Attending: FAMILY MEDICINE | Admitting: FAMILY MEDICINE

## 2020-06-18 DIAGNOSIS — E78.5 DYSLIPIDEMIA: ICD-10-CM

## 2020-06-18 PROCEDURE — 75571 CT HRT W/O DYE W/CA TEST: CPT

## 2020-06-18 NOTE — TELEPHONE ENCOUNTER
Disp  Refills  Start  End  HENRIQUE    omeprazole (PRILOSEC) 20 MG DR capsule  30 capsule  0  6/2/2020 7/2/2020  --    Sig - Route: Take 1 capsule (20 mg) by mouth daily - Oral        LOV: 6/4/2020  Future Office visit: No future appointment scheduled a this time.       Routing refill request to provider for review/approval because:  Medication was prescribed in the ED and scheduled to be done on July 2nd.     Unable to complete prescription refill per RN Medication Refill Policy.................... Ariane Ball RN ....................  6/18/2020   3:24 PM

## 2020-06-24 ENCOUNTER — TELEPHONE (OUTPATIENT)
Dept: FAMILY MEDICINE | Facility: OTHER | Age: 40
End: 2020-06-24

## 2020-06-24 DIAGNOSIS — K21.00 GASTROESOPHAGEAL REFLUX DISEASE WITH ESOPHAGITIS: ICD-10-CM

## 2020-06-24 DIAGNOSIS — R07.89 ATYPICAL CHEST PAIN: ICD-10-CM

## 2020-06-24 DIAGNOSIS — E78.5 DYSLIPIDEMIA: Primary | ICD-10-CM

## 2020-06-24 NOTE — TELEPHONE ENCOUNTER
"Requested Prescriptions   Pending Prescriptions Disp Refills     omeprazole (PRILOSEC) 20 MG DR capsule [Pharmacy Med Name: OMEPRAZOLE 20MG DR CAPSULE] 30 capsule 0     Sig: TAKE 1 CAPSULE (20 MG) BY MOUTH DAILY       PPI Protocol Passed - 6/24/2020  8:58 AM        Passed - Not on Clopidogrel (unless Pantoprazole ordered)        Passed - No diagnosis of osteoporosis on record        Passed - Recent (12 mo) or future (30 days) visit within the authorizing provider's specialty     Patient has had an office visit with the authorizing provider or a provider within the authorizing providers department within the previous 12 mos or has a future within next 30 days. See \"Patient Info\" tab in inbasket, or \"Choose Columns\" in Meds & Orders section of the refill encounter.              Passed - Medication is active on med list        Passed - Patient is age 18 or older         LOV 6/4/20 [place]  Prescription approved per [facility] Refill Protocol.  Brenda J. Goodell, RN on 6/24/2020 at 10:13 AM      "

## 2020-06-24 NOTE — TELEPHONE ENCOUNTER
TYP- Patient is calling to see if he can get the results of his cholesterol results from last week. He is also still experiencing chest pain. Ok to leave a message at his work number until 5pm today.

## 2020-06-26 NOTE — TELEPHONE ENCOUNTER
I did not do lipids last visit    If he would like, he can schedule fasting lab appointment    Elizabeth Flores MD

## 2020-06-26 NOTE — TELEPHONE ENCOUNTER
Patient notified, he is wondering if a heart monitor would be something to check out?  Kamla Goodman LPN .............6/26/2020     10:48 AM

## 2020-07-02 DIAGNOSIS — E78.5 DYSLIPIDEMIA: ICD-10-CM

## 2020-07-02 LAB
CHOLEST SERPL-MCNC: 184 MG/DL
GLUCOSE SERPL-MCNC: 95 MG/DL (ref 70–105)
HDLC SERPL-MCNC: 21 MG/DL (ref 23–92)
LDLC SERPL CALC-MCNC: ABNORMAL MG/DL
NONHDLC SERPL-MCNC: 163 MG/DL
TRIGL SERPL-MCNC: 855 MG/DL

## 2020-07-02 PROCEDURE — 82947 ASSAY GLUCOSE BLOOD QUANT: CPT | Mod: ZL | Performed by: FAMILY MEDICINE

## 2020-07-02 PROCEDURE — 80061 LIPID PANEL: CPT | Mod: ZL | Performed by: FAMILY MEDICINE

## 2020-07-07 ENCOUNTER — HOSPITAL ENCOUNTER (OUTPATIENT)
Dept: CARDIOLOGY | Facility: OTHER | Age: 40
Discharge: HOME | End: 2020-07-07
Attending: FAMILY MEDICINE | Admitting: FAMILY MEDICINE
Payer: COMMERCIAL

## 2020-07-07 DIAGNOSIS — R07.89 OTHER CHEST PAIN: ICD-10-CM

## 2020-07-07 PROCEDURE — 93225 XTRNL ECG REC<48 HRS REC: CPT

## 2020-07-07 PROCEDURE — 93227 XTRNL ECG REC<48 HR R&I: CPT | Performed by: INTERNAL MEDICINE

## 2020-07-07 NOTE — PATIENT INSTRUCTIONS
Patient instructed to:   -wear device for 48 hours   -documents cardiac symptoms, activities and medication taken in the patient  diary   -not to take a bath, shower, swim or sauna   -not use electric blanket   -remove device at end of the test period and return to the diagnostics desk

## 2020-07-10 ENCOUNTER — HOSPITAL ENCOUNTER (OUTPATIENT)
Dept: GENERAL RADIOLOGY | Facility: OTHER | Age: 40
Discharge: HOME | End: 2020-07-10
Attending: FAMILY MEDICINE | Admitting: FAMILY MEDICINE
Payer: COMMERCIAL

## 2020-07-10 DIAGNOSIS — R59.0 HILAR ADENOPATHY: ICD-10-CM

## 2020-07-10 PROCEDURE — 71046 X-RAY EXAM CHEST 2 VIEWS: CPT

## 2020-07-24 ENCOUNTER — TELEPHONE (OUTPATIENT)
Dept: FAMILY MEDICINE | Facility: OTHER | Age: 40
End: 2020-07-24

## 2020-07-24 NOTE — TELEPHONE ENCOUNTER
Patient was calling back a nurse. Notified of results per typ of holter monitor.   Kamla Goodman LPN .............7/24/2020     8:12 AM

## 2020-07-28 ENCOUNTER — OFFICE VISIT (OUTPATIENT)
Dept: FAMILY MEDICINE | Facility: OTHER | Age: 40
End: 2020-07-28
Attending: FAMILY MEDICINE
Payer: COMMERCIAL

## 2020-07-28 VITALS
BODY MASS INDEX: 28.21 KG/M2 | HEART RATE: 75 BPM | OXYGEN SATURATION: 98 % | SYSTOLIC BLOOD PRESSURE: 126 MMHG | RESPIRATION RATE: 20 BRPM | WEIGHT: 191 LBS | DIASTOLIC BLOOD PRESSURE: 78 MMHG

## 2020-07-28 DIAGNOSIS — E78.5 DYSLIPIDEMIA: Primary | ICD-10-CM

## 2020-07-28 PROCEDURE — 99213 OFFICE O/P EST LOW 20 MIN: CPT | Performed by: FAMILY MEDICINE

## 2020-07-28 RX ORDER — ATORVASTATIN CALCIUM 10 MG/1
10 TABLET, FILM COATED ORAL DAILY
Qty: 90 TABLET | Refills: 0 | Status: SHIPPED | OUTPATIENT
Start: 2020-07-28 | End: 2020-10-12

## 2020-07-28 ASSESSMENT — ANXIETY QUESTIONNAIRES
3. WORRYING TOO MUCH ABOUT DIFFERENT THINGS: NOT AT ALL
5. BEING SO RESTLESS THAT IT IS HARD TO SIT STILL: NOT AT ALL
1. FEELING NERVOUS, ANXIOUS, OR ON EDGE: NOT AT ALL
7. FEELING AFRAID AS IF SOMETHING AWFUL MIGHT HAPPEN: NOT AT ALL
2. NOT BEING ABLE TO STOP OR CONTROL WORRYING: NOT AT ALL
IF YOU CHECKED OFF ANY PROBLEMS ON THIS QUESTIONNAIRE, HOW DIFFICULT HAVE THESE PROBLEMS MADE IT FOR YOU TO DO YOUR WORK, TAKE CARE OF THINGS AT HOME, OR GET ALONG WITH OTHER PEOPLE: NOT DIFFICULT AT ALL
6. BECOMING EASILY ANNOYED OR IRRITABLE: SEVERAL DAYS
GAD7 TOTAL SCORE: 1

## 2020-07-28 ASSESSMENT — PATIENT HEALTH QUESTIONNAIRE - PHQ9
5. POOR APPETITE OR OVEREATING: NOT AT ALL
SUM OF ALL RESPONSES TO PHQ QUESTIONS 1-9: 4

## 2020-07-28 ASSESSMENT — PAIN SCALES - GENERAL: PAINLEVEL: MODERATE PAIN (5)

## 2020-07-28 NOTE — NURSING NOTE
Patient is here to follow up on results.      Medication Reconciliation: complete    Kamla Goodman LPN  7/28/2020 3:01 PM

## 2020-07-28 NOTE — PATIENT INSTRUCTIONS
Coronary calcium score was 0, this is reassuring    Total cholesterol ok, triglycerides very high and HDL very low, would recommend starting Lipitor 10 mg daily and Omega 3 fatty acids 1000 mg daily    Recheck fasting cholesterol in 2 months

## 2020-07-28 NOTE — PROGRESS NOTES
SUBJECTIVE:   Ulises Greene is a 39 year old male who presents to clinic today for the following health issues:  Nursing Notes:   Kamla Goodman, LPN  7/28/2020  3:13 PM  Signed  Patient is here to follow up on results.      Medication Reconciliation: nathan Goodman LPN  7/28/2020 3:01 PM      HPI    Pleasant 39-year-old male presents to discuss recent cholesterol panel.  He had an episode of atypical chest pain.  Was seen in the emergency department.  CTA was normal.  This was followed by coronary calcium scan that was 0.  Fasting lipid panel showed significant elevation in triglycerides and an HDL of 20.  LDL was unable to be calculated.  Since I last saw him he has been successful in quitting smoking.    He has had no recurrence of chest pain or shortness of breath.  He does have a strong family history of coronary artery disease.    He admits to not having the greatest diet.  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 No pertinent past medical history.   Current Outpatient Medications   Medication Sig Dispense Refill     atorvastatin (LIPITOR) 10 MG tablet Take 1 tablet (10 mg) by mouth daily 90 tablet 0     clonazePAM (KLONOPIN) 0.5 MG tablet Take 0.5 mg by mouth nightly as needed for anxiety       LYRICA 200 MG capsule Take 1 capsule by mouth 2 times daily       medical cannabis (Patient's own supply) See  Admin Instructions (The purpose of this order is to document that the patient reports taking medical cannabis.  This is not a prescription, and is not used to certify that the patient has a qualifying medical condition.)       omeprazole (PRILOSEC) 20 MG DR capsule TAKE 1 CAPSULE (20 MG) BY MOUTH DAILY 30 capsule 6     traZODone (DESYREL) 50 MG tablet Take 100 mg by mouth nightly as needed  90 tablet      varenicline (CHANTIX NASIM) 0.5 MG X 11 & 1 MG X 42 tablet Take 0.5 mg tab daily for 3 days, THEN 0.5 mg tab twice daily for 4 days, THEN 1 mg twice daily. 53 tablet 0       Review of Systems   All other systems reviewed and are negative.       OBJECTIVE:     /78 (BP Location: Right arm, Patient Position: Sitting, Cuff Size: Adult Regular)   Pulse 75   Resp 20   Wt 86.6 kg (191 lb)   SpO2 98%   BMI 28.21 kg/m    Body mass index is 28.21 kg/m .  Physical Exam  Cardiovascular:      Rate and Rhythm: Normal rate.      Heart sounds: No murmur.   Neurological:      Mental Status: He is alert.   Psychiatric:         Mood and Affect: Mood normal.         Recent Labs   Lab Test 07/02/20  0755 02/13/18  0916   CHOL 184 168   HDL 21* 25*   LDL Cannot estimate LDL when triglyceride exceeds 400 mg/dL Cannot estimate LDL when triglyceride exceeds 400 mg/dL  85   TRIG 855* 457*       History   Smoking Status     Former Smoker     Packs/day: 0.50     Types: Cigarettes     Quit date: 4/2/2018   Smokeless Tobacco     Never Used     BP Readings from Last 1 Encounters:   07/28/20 126/78     Recent Labs   Lab Test 07/02/20  0755 02/13/18  0916   CHOL 184 168   HDL 21* 25*   LDL Cannot estimate LDL when triglyceride exceeds 400 mg/dL Cannot estimate LDL when triglyceride exceeds 400 mg/dL  85   TRIG 855* 457*     No results for input(s): A1C in the last 01694 hours.    [place] Risk Calculator:  Risk factor: 11%    ASSESSMENT/PLAN:           ICD-10-CM    1. Dyslipidemia  E78.5 atorvastatin (LIPITOR) 10 MG tablet     Lipid  Profile     AST (SGOT)     [place]ams risk calculator is 11%.  Coronary calcium scan was 0.  Difficult to determine benefit of statin therapy.  Given his family history he is most comfortable starting low-dose statin.  Will start Lipitor 10 mg daily.  Congratulated him on smoking cessation.  Follow-up in 2 months for repeat lipid panel and AST.    Elizabeth Flores MD  [facility] AND [facility]

## 2020-07-29 ASSESSMENT — ANXIETY QUESTIONNAIRES: GAD7 TOTAL SCORE: 1

## 2020-10-11 DIAGNOSIS — E78.5 DYSLIPIDEMIA: ICD-10-CM

## 2020-10-12 RX ORDER — ATORVASTATIN CALCIUM 10 MG/1
10 TABLET, FILM COATED ORAL DAILY
Qty: 90 TABLET | Refills: 0 | Status: SHIPPED | OUTPATIENT
Start: 2020-10-12 | End: 2021-04-20

## 2020-10-12 NOTE — TELEPHONE ENCOUNTER
" Disp Refills Start End HENRIQUE   atorvastatin (LIPITOR) 10 MG tablet 90 tablet 0 7/28/2020  --   Sig - Route: Take 1 tablet (10 mg) by mouth daily - Oral       LOV: 7/28/2020  Future Office visit: No future appointment scheduled at this time.  Per last office visit patient was to follow up in 2 months post office visit on 7/28/2020. Attempted to contact the patient to schedule an appointment for lab recheck. Left message. Waiting on call back.     Pharmacy note: \"Patient enrolled in our Ready Refill service to improveadherence. We are requesting a refill authorization inadvance to ensure an active prescription is on file.\"    Routing refill request to provider for review/approval because:  Patient is due for a lab recheck. Lab orders are in for scheduling.    Requested Prescriptions   Pending Prescriptions Disp Refills     atorvastatin (LIPITOR) 10 MG tablet [Pharmacy Med Name: ATORVASTATIN 10MG TABLET] 90 tablet 0     Sig: TAKE 1 TABLET (10 MG) BY MOUTH DAILY       Statins Protocol Passed - 10/11/2020  5:09 AM        Passed - LDL on file in past 12 months     Recent Labs   Lab Test 07/02/20  0755   LDL Cannot estimate LDL when triglyceride exceeds 400 mg/dL             Passed - No abnormal creatine kinase in past 12 months     No lab results found.             Passed - Recent (12 mo) or future (30 days) visit within the authorizing provider's specialty     Patient has had an office visit with the authorizing provider or a provider within the authorizing providers department within the previous 12 mos or has a future within next 30 days. See \"Patient Info\" tab in inbasket, or \"Choose Columns\" in Meds & Orders section of the refill encounter.              Passed - Medication is active on med list        Passed - Patient is age 18 or older         Unable to complete prescription refill per RN Medication Refill Policy.................... Ariane Ball RN ....................  10/12/2020   1:22 PM            "

## 2020-10-16 DIAGNOSIS — E78.5 DYSLIPIDEMIA: ICD-10-CM

## 2020-10-16 RX ORDER — ATORVASTATIN CALCIUM 10 MG/1
10 TABLET, FILM COATED ORAL DAILY
Qty: 90 TABLET | Refills: 0 | OUTPATIENT
Start: 2020-10-16

## 2020-10-16 NOTE — TELEPHONE ENCOUNTER
Disp Refills Start End HENRIQUE   atorvastatin (LIPITOR) 10 MG tablet 90 tablet 0 10/12/2020  No   Sig - Route: TAKE 1 TABLET (10 MG) BY MOUTH DAILY - Oral       LOV: 7/28/2020  Future Office visit: No future appointment scheduled at this.    Redundant refill request refused: Too soon: Refilled on 10/12/2020  Ariane Ball RN  ....................  10/16/2020   3:59 PM

## 2020-11-06 DIAGNOSIS — E78.5 DYSLIPIDEMIA: ICD-10-CM

## 2020-11-06 LAB
AST SERPL W P-5'-P-CCNC: 22 U/L (ref 13–39)
CHOLEST SERPL-MCNC: 184 MG/DL
HDLC SERPL-MCNC: 22 MG/DL (ref 23–92)
LDLC SERPL CALC-MCNC: ABNORMAL MG/DL
NONHDLC SERPL-MCNC: 162 MG/DL
TRIGL SERPL-MCNC: 842 MG/DL

## 2020-11-06 PROCEDURE — 36415 COLL VENOUS BLD VENIPUNCTURE: CPT | Mod: ZL | Performed by: FAMILY MEDICINE

## 2020-11-06 PROCEDURE — 80061 LIPID PANEL: CPT | Mod: ZL | Performed by: FAMILY MEDICINE

## 2020-11-06 PROCEDURE — 84450 TRANSFERASE (AST) (SGOT): CPT | Mod: ZL | Performed by: FAMILY MEDICINE

## 2020-12-03 ENCOUNTER — HOSPITAL ENCOUNTER (OUTPATIENT)
Dept: GENERAL RADIOLOGY | Facility: OTHER | Age: 40
Discharge: HOME | End: 2020-12-03
Attending: NURSE PRACTITIONER | Admitting: FAMILY MEDICINE
Payer: OTHER MISCELLANEOUS

## 2020-12-03 DIAGNOSIS — S93.402A SPRAIN OF UNSPECIFIED LIGAMENT OF LEFT ANKLE, INITIAL ENCOUNTER: ICD-10-CM

## 2020-12-03 PROCEDURE — 73610 X-RAY EXAM OF ANKLE: CPT | Mod: LT

## 2020-12-29 ENCOUNTER — OFFICE VISIT (OUTPATIENT)
Dept: FAMILY MEDICINE | Facility: OTHER | Age: 40
End: 2020-12-29
Attending: FAMILY MEDICINE
Payer: COMMERCIAL

## 2020-12-29 VITALS
HEART RATE: 72 BPM | WEIGHT: 201 LBS | RESPIRATION RATE: 18 BRPM | SYSTOLIC BLOOD PRESSURE: 100 MMHG | BODY MASS INDEX: 29.68 KG/M2 | DIASTOLIC BLOOD PRESSURE: 72 MMHG | TEMPERATURE: 97.2 F

## 2020-12-29 DIAGNOSIS — E78.1 HYPERTRIGLYCERIDEMIA: Primary | ICD-10-CM

## 2020-12-29 PROCEDURE — 99213 OFFICE O/P EST LOW 20 MIN: CPT | Performed by: FAMILY MEDICINE

## 2020-12-29 RX ORDER — FENOFIBRATE 48 MG/1
48 TABLET, COATED ORAL DAILY
Qty: 90 TABLET | Refills: 0 | Status: SHIPPED | OUTPATIENT
Start: 2020-12-29 | End: 2021-03-16

## 2020-12-29 RX ORDER — TRAMADOL HYDROCHLORIDE 50 MG/1
TABLET ORAL
COMMUNITY
Start: 2020-12-08 | End: 2021-10-03

## 2020-12-29 NOTE — PROGRESS NOTES
SUBJECTIVE:   Ulises Greene is a 40 year old male who presents to clinic today for the following health issues:  Nursing Notes:   Bren Man LPN  12/29/2020 10:03 AM  Signed  Chief Complaint   Patient presents with     Follow Up     lab work         Medication Reconciliation: complete    Bren Man LPN        HPI    40-year-old male presents to discuss recent lab work.  At his last visit was found to have significant elevation in triglycerides.  LDL was unable to be calculated.  Given family history of early onset heart disease he decided to start low-dose statin.  He did have a coronary calcium scan that was 0.  He started Lipitor 10 mg daily and has been tolerating it well.  Repeat lipid panel shows minimal improvement.  Triglycerides remain above 800.      Review of Systems     OBJECTIVE:     /72 (BP Location: Right arm, Patient Position: Sitting, Cuff Size: Adult Regular)   Pulse 72   Temp 97.2  F (36.2  C)   Resp 18   Wt 91.2 kg (201 lb)   BMI 29.68 kg/m    Body mass index is 29.68 kg/m .  Physical Exam    Diagnostic Test Results:  No results found for any visits on 12/29/20.     Orders Only on 11/06/2020   Component Date Value Ref Range Status     AST 11/06/2020 22  13 - 39 U/L Final     Cholesterol 11/06/2020 184  <200 mg/dL Final     Triglycerides 11/06/2020 842* <150 mg/dL Final    Comment: Borderline high:  150-199 mg/dl  High:             200-499 mg/dl  Very high:       >499 mg/dl       HDL Cholesterol 11/06/2020 22* 23 - 92 mg/dL Final     LDL Cholesterol Calculated 11/06/2020 Cannot estimate LDL when triglyceride exceeds 400 mg/dL  <100 mg/dL Final    Desirable:       <100 mg/dl     Non HDL Cholesterol 11/06/2020 162* <130 mg/dL Final    Comment: Above Desirable:  130-159 mg/dl  Borderline high:  160-189 mg/dl  High:             190-219 mg/dl  Very high:       >219 mg/dl           ASSESSMENT/PLAN:           ICD-10-CM    1. Hypertriglyceridemia  E78.1 fenofibrate  (TRICOR) 48 MG tablet     Lipid Profile     AST (SGOT)       Patient will continue on Lipitor 10 mg daily and add TriCor 40 mg daily.  Repeat lipid panel in 8 weeks.    I spent over 15 minutes with the patient, greater than 50% spent in counseling and coordination of care.    Elizabeth Flores MD  [facility]

## 2020-12-29 NOTE — NURSING NOTE
Chief Complaint   Patient presents with     Follow Up     lab work         Medication Reconciliation: complete    Bren Man, LPN

## 2021-03-15 DIAGNOSIS — E78.1 HYPERTRIGLYCERIDEMIA: ICD-10-CM

## 2021-03-15 NOTE — LETTER
March 16, 2021      Ulises Greene  [address]  [place] [address]        Dear Ulises,                 This letter is to remind you that you are due for your Medication required labs follow up with Elizabeth Corrigan. Your last office visit was on 12/29/2020. It was recommended to follow up in 8 weeks.    A refill request for Tricor has been sent to your provider for review and consideration. Additional refills require you to complete this appointment.    Please call the clinic at [phone] to schedule your appointment.    If you should require additional refills before your scheduled appointment, please contact your pharmacy and we will refill your medication until the date of your appointment.    If you are no longer seeing Elizabeth Corrigan for primary care, please call to let us know.     Thank you for choosing [facility] and [facility] for your health care needs.    Sincerely,    Refill RN  [facility]

## 2021-03-16 RX ORDER — FENOFIBRATE 48 MG/1
48 TABLET, COATED ORAL DAILY
Qty: 90 TABLET | Refills: 0 | Status: SHIPPED | OUTPATIENT
Start: 2021-03-16 | End: 2021-10-03

## 2021-03-16 NOTE — TELEPHONE ENCOUNTER
" Disp Refills Start End HENRIQUE   fenofibrate (TRICOR) 48 MG tablet 90 tablet 0 12/29/2020  --   Sig - Route: Take 1 tablet (48 mg) by mouth daily - Oral       LOV: 12/29/2020  Future Office visit: No future appointment scheduled at this time.  Patient due for a repeat lipid panel as it was recommended to be completed 8 weeks post last office visit.    Routing refill request to provider for review/approval because:  Unsure if patient needs to complete lab prior to receiving refill as limited refill provided on 12/29/2020. Reminder letter sent.    Requested Prescriptions   Pending Prescriptions Disp Refills     fenofibrate (TRICOR) 48 MG tablet [Pharmacy Med Name: FENOFIBRATE 48MG TABLET] 90 tablet 0     Sig: TAKE 1 TABLET (48 MG) BY MOUTH DAILY       Fibrates Passed - 3/15/2021  1:22 AM        Passed - Lipid panel on file in past 12 months     Recent Labs   Lab Test 11/06/20  0741   CHOL 184   TRIG 842*   HDL 22*   LDL Cannot estimate LDL when triglyceride exceeds 400 mg/dL   NHDL 162*               Passed - No abnormal creatine kinase in past 12 months     No lab results found.             Passed - Recent (12 mo) or future (30 days) visit within the authorizing provider's specialty     Patient has had an office visit with the authorizing provider or a provider within the authorizing providers department within the previous 12 mos or has a future within next 30 days. See \"Patient Info\" tab in inbasket, or \"Choose Columns\" in Meds & Orders section of the refill encounter.              Passed - Medication is active on med list        Passed - Patient is age 18 or older         Unable to complete prescription refill per RN Medication Refill Policy.................... Ariane Ball RN ....................  3/16/2021   9:26 AM        "

## 2021-03-23 DIAGNOSIS — E78.1 HYPERTRIGLYCERIDEMIA: ICD-10-CM

## 2021-03-23 RX ORDER — FENOFIBRATE 48 MG/1
48 TABLET, COATED ORAL DAILY
Qty: 90 TABLET | Refills: 0 | OUTPATIENT
Start: 2021-03-23

## 2021-03-23 NOTE — TELEPHONE ENCOUNTER
FENOFIBRATE 48MG TABLET  Provider refilled for 90 days last week and noted to have future orders for lab work.  Will alert pharmacy and deny refill. Unable to complete prescription refill per RNMedication Refill Policy.................... Marlin Qureshi RN ....................  3/23/2021   5:41 AM

## 2021-04-20 DIAGNOSIS — E78.5 DYSLIPIDEMIA: ICD-10-CM

## 2021-04-20 RX ORDER — ATORVASTATIN CALCIUM 10 MG/1
10 TABLET, FILM COATED ORAL DAILY
Qty: 90 TABLET | Refills: 0 | Status: SHIPPED | OUTPATIENT
Start: 2021-04-20 | End: 2021-10-03

## 2021-04-20 NOTE — TELEPHONE ENCOUNTER
" Disp Refills Start End HENRIQUE   atorvastatin (LIPITOR) 10 MG tablet 90 tablet 0 10/12/2020  No   Sig - Route: TAKE 1 TABLET (10 MG) BY MOUTH DAILY - Oral       LOV: 12/29/2020  Future Office visit:   No future appointment scheduled at this time. Patient was to repeat lipid panel in 8 weeks. Left message for patient to call back to schedule a lab only appointment.    Routing refill request to provider for review/approval because:  Due for labs to be rechecked.    Requested Prescriptions   Pending Prescriptions Disp Refills     atorvastatin (LIPITOR) 10 MG tablet 90 tablet 0     Sig: Take 1 tablet (10 mg) by mouth daily       Statins Protocol Passed - 4/20/2021  9:27 AM        Passed - LDL on file in past 12 months     Recent Labs   Lab Test 11/06/20  0741   LDL Cannot estimate LDL when triglyceride exceeds 400 mg/dL             Passed - No abnormal creatine kinase in past 12 months     No lab results found.             Passed - Recent (12 mo) or future (30 days) visit within the authorizing provider's specialty     Patient has had an office visit with the authorizing provider or a provider within the authorizing providers department within the previous 12 mos or has a future within next 30 days. See \"Patient Info\" tab in inbasket, or \"Choose Columns\" in Meds & Orders section of the refill encounter.              Passed - Medication is active on med list        Passed - Patient is age 18 or older         Unable to complete prescription refill per RN Medication Refill Policy.................... Ariane Ball RN ....................  4/20/2021   10:52 AM        "

## 2021-07-09 DIAGNOSIS — K21.00 GASTROESOPHAGEAL REFLUX DISEASE WITH ESOPHAGITIS: ICD-10-CM

## 2021-07-09 NOTE — TELEPHONE ENCOUNTER
"Requested Prescriptions   Pending Prescriptions Disp Refills     omeprazole (PRILOSEC) 20 MG DR capsule [Pharmacy Med Name: OMEPRAZOLE 20MG DR CAPSULE] 30 capsule 6     Sig: TAKE 1 CAPSULE (20 MG) BY MOUTH DAILY       PPI Protocol Passed - 7/9/2021  1:01 AM        Passed - Not on Clopidogrel (unless Pantoprazole ordered)        Passed - No diagnosis of osteoporosis on record        Passed - Recent (12 mo) or future (30 days) visit within the authorizing provider's specialty     Patient has had an office visit with the authorizing provider or a provider within the authorizing providers department within the previous 12 mos or has a future within next 30 days. See \"Patient Info\" tab in inbasket, or \"Choose Columns\" in Meds & Orders section of the refill encounter.              Passed - Medication is active on med list        Passed - Patient is age 18 or older         LOV-12/29/20 and remains current on mediation list.Prescription refilled per RN Medication RefillPolbuddyy.................... Marlin Qureshi RN ....................  7/9/2021   2:33 PM        "

## 2021-10-03 ENCOUNTER — HOSPITAL ENCOUNTER (EMERGENCY)
Facility: OTHER | Age: 41
Discharge: HOME | End: 2021-10-03
Attending: PHYSICIAN ASSISTANT | Admitting: PHYSICIAN ASSISTANT
Payer: COMMERCIAL

## 2021-10-03 ENCOUNTER — APPOINTMENT (OUTPATIENT)
Dept: GENERAL RADIOLOGY | Facility: OTHER | Age: 41
End: 2021-10-03
Attending: PHYSICIAN ASSISTANT
Payer: COMMERCIAL

## 2021-10-03 VITALS
SYSTOLIC BLOOD PRESSURE: 133 MMHG | BODY MASS INDEX: 27.4 KG/M2 | OXYGEN SATURATION: 97 % | WEIGHT: 185 LBS | TEMPERATURE: 98.7 F | HEART RATE: 70 BPM | DIASTOLIC BLOOD PRESSURE: 83 MMHG | HEIGHT: 69 IN | RESPIRATION RATE: 16 BRPM

## 2021-10-03 DIAGNOSIS — S90.112A CONTUSION OF LEFT GREAT TOE WITHOUT DAMAGE TO NAIL, INITIAL ENCOUNTER: ICD-10-CM

## 2021-10-03 DIAGNOSIS — S91.109A AVULSION OF SKIN OF TOE, INITIAL ENCOUNTER: ICD-10-CM

## 2021-10-03 PROCEDURE — 73630 X-RAY EXAM OF FOOT: CPT | Mod: LT

## 2021-10-03 PROCEDURE — 99283 EMERGENCY DEPT VISIT LOW MDM: CPT | Performed by: PHYSICIAN ASSISTANT

## 2021-10-03 PROCEDURE — 99282 EMERGENCY DEPT VISIT SF MDM: CPT | Performed by: PHYSICIAN ASSISTANT

## 2021-10-03 ASSESSMENT — ENCOUNTER SYMPTOMS
SEIZURES: 0
AGITATION: 0
CONFUSION: 0
STRIDOR: 0
FACIAL SWELLING: 0
FACIAL ASYMMETRY: 0
EYE PAIN: 0
TREMORS: 0
FLANK PAIN: 0
BACK PAIN: 0
FEVER: 0
ABDOMINAL PAIN: 0

## 2021-10-03 ASSESSMENT — MIFFLIN-ST. JEOR: SCORE: 1729.53

## 2021-10-03 NOTE — ED TRIAGE NOTES
"ED Nursing Triage Note (General)   ________________________________    Ulises Greene is a 40 year old Male that presents to triage private car  With history of  Dropped  on left great toe causing a laceration and pain. History of foot surgery with fused toes reported by patient   Significant symptoms had onset today   /82   Pulse 92   Temp 99.3  F (37.4  C) (Tympanic)   Resp 18   Ht 1.753 m (5' 9\")   Wt 83.9 kg (185 lb)   SpO2 97%   BMI 27.32 kg/m  t  Patient appears alert  and oriented, in no acute distress., and cooperative and calm behavior.    GCS Total = 15  Airway: intact  Breathing noted as Normal  Circulation Normal  Skin:  Normal  Action taken:  Triage to critical care immediately      [facility] S[facility]UATION Spouse    Mee Gaitan RN on 10/3/2021 at 5:57 PM      "

## 2021-10-03 NOTE — ED PROVIDER NOTES
History     Chief Complaint   Patient presents with     Foot Pain     dropped  on left great toe     HPI  Ulises Greene is a 40 year old male who was moving a  when it dropped and landed on his left great toe causing a skin avulsion.  Patient has a history of previous foot surgeries and fused toes.  He is here for further evaluation at this time.  Denies any other injuries.    Allergies:  Allergies   Allergen Reactions     Gabapentin      Other reaction(s): Tremors     Methadone      Other reaction(s): Sleep Disturbances     Tramadol Other (See Comments)     tremors       Problem List:    Patient Active Problem List    Diagnosis Date Noted     Former smoker 05/17/2018     Priority: Medium     Complex regional pain syndrome type 2 of left lower extremity 10/24/2017     Priority: Medium     Left foot pain 12/23/2016     Priority: Medium     Onychomycosis 02/01/2016     Priority: Medium     Diastasis recti 08/10/2015     Priority: Medium     Hypertriglyceridemia 06/13/2014     Priority: Medium     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     Priority: Medium     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 No pertinent past medical history.    Past Surgical History:    Past Surgical History:   Procedure Laterality Date     EXTRACTION(S) DENTAL       LAPAROSCOPIC CHOLECYSTECTOMY  06/26/2014     OPEN REDUCTION INTERNAL FIXATION CALCANEOUS Left 2008    Had 3 surgeries     Spinal cord stimulator  2012    Implanted and then removed about a year later       Family History:    History reviewed. No pertinent family history.    Social History:  Marital Status:   [2]  Social History     Tobacco Use     Smoking status: Current Every Day Smoker     Packs/day: 1.00     Types: Cigarettes     Last attempt to quit: 4/2/2018     Years since quitting: 3.5     Smokeless  "tobacco: Never Used   Substance Use Topics     Alcohol use: Yes     Alcohol/week: 0.8 standard drinks     Comment: rare     Drug use: Yes     Types: Marijuana     Comment: medical        Medications:    LYRICA 200 MG capsule  medical cannabis (Patient's own supply)  tiZANidine (ZANAFLEX) 4 MG tablet          Review of Systems   Constitutional: Negative for fever.   HENT: Negative for drooling and facial swelling.    Eyes: Negative for pain and visual disturbance.   Respiratory: Negative for stridor.    Cardiovascular: Negative for chest pain.   Gastrointestinal: Negative for abdominal pain.   Genitourinary: Negative for flank pain.   Musculoskeletal: Negative for back pain.        Left great toe crush injury with pain.  Skin avulsion noted to the medial aspect.   Skin: Negative for pallor.   Neurological: Negative for tremors, seizures and facial asymmetry.   Psychiatric/Behavioral: Negative for agitation and confusion.   All other systems reviewed and are negative.      Physical Exam   BP: 132/82  Pulse: 92  Temp: 99.3  F (37.4  C)  Resp: 18  Height: 175.3 cm (5' 9\")  Weight: 83.9 kg (185 lb)  SpO2: 97 %      Physical Exam  Vitals and nursing note reviewed.   Constitutional:       General: He is not in acute distress.     Appearance: Normal appearance. He is not ill-appearing or toxic-appearing.   HENT:      Head: Normocephalic. No raccoon eyes, right periorbital erythema or left periorbital erythema.      Right Ear: No drainage or tenderness.      Left Ear: No drainage or tenderness.      Nose: Nose normal.   Eyes:      General: Lids are normal. Gaze aligned appropriately. No scleral icterus.     Extraocular Movements: Extraocular movements intact.   Neck:      Trachea: No tracheal deviation.   Cardiovascular:      Rate and Rhythm: Normal rate.   Pulmonary:      Effort: Pulmonary effort is normal. No respiratory distress.      Breath sounds: No stridor. No wheezing.   Abdominal:      Tenderness: There is no " abdominal tenderness.   Musculoskeletal:         General: Tenderness and signs of injury present. No deformity. Normal range of motion.      Cervical back: Normal range of motion. No signs of trauma.      Comments: Left great toe with pain on palpation.  Patient does have a medial skin tear or avulsion.  Nonrepairable.  Otherwise CMS x4   Skin:     General: Skin is warm and dry.      Coloration: Skin is not jaundiced or pale.   Neurological:      General: No focal deficit present.      Mental Status: He is alert and oriented to person, place, and time.      GCS: GCS eye subscore is 4. GCS verbal subscore is 5. GCS motor subscore is 6.      Motor: No tremor or seizure activity.   Psychiatric:         Attention and Perception: Attention normal.         Mood and Affect: Mood normal.         ED Course       Results for orders placed or performed during the hospital encounter of 10/03/21 (from the past 24 hour(s))   XR Foot Left G/E 3 Views    Narrative    PROCEDURE:  XR FOOT LEFT G/E 3 VIEWS    HISTORY: injury    COMPARISON:  None.    TECHNIQUE:  3 views of the left foot were obtained.    FINDINGS:  There is an arthrodesis performed at the first  metatarsophalangeal joint the arthrodesis appears solid and is fixed  with a solitary screw. There is deformity of the distal second third  fourth and fifth metatarsals which appears to most likely represent  postoperative changes. There is an old healed fracture of the base of  the fifth metatarsal. There is ankylosis of the proximal  interphalangeal joints of the second third and fourth toes. Arthritic  changes are noted at the tarsal metatarsal articulations. There are no  acute fractures.       Impression    IMPRESSION: No acute fracture.      KEN REDD MD         SYSTEM ID:  [id number]       Medications - No data to display    Assessments & Plan (with Medical Decision Making)     I have reviewed the nursing notes.    I have reviewed the findings, diagnosis, plan and  need for follow up with the patient.      New Prescriptions    No medications on file       Final diagnoses:   Contusion of left great toe without damage to nail, initial encounter   Avulsion of skin of toe, initial encounter - left great toe     Afebrile.  Vital signs stable.  Patient with a crush injury and contusion to his left great toe.  X-rays show no signs of fracture or deformity.  He has a tiny avulsion of the skin in the medial part of this toe as well this is nonrepairable.  This was covered with a bandage.  I discussed bone bruises and how they can feel much like a fracture initially.  He has a postop shoe as well as crutches at home.  I discussed using ice, Tylenol, and Motrin for pain relief follow-up with his primary care provider if symptoms persist for further evaluation as needed    10/3/2021   [facility] AND [facility]     Damien Ledezma PA-C  10/03/21 2012

## 2022-01-14 ENCOUNTER — ALLIED HEALTH/NURSE VISIT (OUTPATIENT)
Dept: FAMILY MEDICINE | Facility: OTHER | Age: 42
End: 2022-01-14
Attending: FAMILY MEDICINE
Payer: COMMERCIAL

## 2022-01-14 DIAGNOSIS — Z20.822 COVID-19 RULED OUT: Primary | ICD-10-CM

## 2022-01-14 PROCEDURE — U0005 INFEC AGEN DETEC AMPLI PROBE: HCPCS | Mod: ZL

## 2022-01-14 PROCEDURE — C9803 HOPD COVID-19 SPEC COLLECT: HCPCS

## 2022-01-16 LAB — SARS-COV-2 RNA RESP QL NAA+PROBE: NEGATIVE

## 2022-03-08 ENCOUNTER — OFFICE VISIT (OUTPATIENT)
Dept: FAMILY MEDICINE | Facility: OTHER | Age: 42
End: 2022-03-08
Attending: FAMILY MEDICINE
Payer: COMMERCIAL

## 2022-03-08 VITALS
RESPIRATION RATE: 18 BRPM | WEIGHT: 190 LBS | OXYGEN SATURATION: 98 % | HEART RATE: 92 BPM | DIASTOLIC BLOOD PRESSURE: 62 MMHG | BODY MASS INDEX: 28.06 KG/M2 | SYSTOLIC BLOOD PRESSURE: 110 MMHG | TEMPERATURE: 98.6 F

## 2022-03-08 DIAGNOSIS — M62.08 DIASTASIS RECTI: ICD-10-CM

## 2022-03-08 DIAGNOSIS — R10.84 ABDOMINAL PAIN, GENERALIZED: ICD-10-CM

## 2022-03-08 DIAGNOSIS — E78.1 HYPERTRIGLYCERIDEMIA: Primary | ICD-10-CM

## 2022-03-08 LAB
ALBUMIN SERPL-MCNC: 4.8 G/DL (ref 3.5–5.7)
ALP SERPL-CCNC: 67 U/L (ref 34–104)
ALT SERPL W P-5'-P-CCNC: 24 U/L (ref 7–52)
ANION GAP SERPL CALCULATED.3IONS-SCNC: 6 MMOL/L (ref 3–14)
AST SERPL W P-5'-P-CCNC: 18 U/L (ref 13–39)
BILIRUB SERPL-MCNC: 0.4 MG/DL (ref 0.3–1)
BUN SERPL-MCNC: 11 MG/DL (ref 7–25)
CALCIUM SERPL-MCNC: 9.8 MG/DL (ref 8.6–10.3)
CHLORIDE BLD-SCNC: 105 MMOL/L (ref 98–107)
CHOLEST SERPL-MCNC: 186 MG/DL
CO2 SERPL-SCNC: 27 MMOL/L (ref 21–31)
CREAT SERPL-MCNC: 1.05 MG/DL (ref 0.7–1.3)
FASTING STATUS PATIENT QL REPORTED: ABNORMAL
GFR SERPL CREATININE-BSD FRML MDRD: 90 ML/MIN/1.73M2
GLUCOSE BLD-MCNC: 90 MG/DL (ref 70–105)
HDLC SERPL-MCNC: 23 MG/DL (ref 23–92)
LDLC SERPL CALC-MCNC: ABNORMAL MG/DL
NONHDLC SERPL-MCNC: 163 MG/DL
POTASSIUM BLD-SCNC: 4.2 MMOL/L (ref 3.5–5.1)
PROT SERPL-MCNC: 7 G/DL (ref 6.4–8.9)
SODIUM SERPL-SCNC: 138 MMOL/L (ref 134–144)
TRIGL SERPL-MCNC: 577 MG/DL

## 2022-03-08 PROCEDURE — 80053 COMPREHEN METABOLIC PANEL: CPT | Mod: ZL | Performed by: FAMILY MEDICINE

## 2022-03-08 PROCEDURE — 99213 OFFICE O/P EST LOW 20 MIN: CPT | Performed by: FAMILY MEDICINE

## 2022-03-08 PROCEDURE — 36415 COLL VENOUS BLD VENIPUNCTURE: CPT | Mod: ZL | Performed by: FAMILY MEDICINE

## 2022-03-08 PROCEDURE — 80061 LIPID PANEL: CPT | Mod: ZL | Performed by: FAMILY MEDICINE

## 2022-03-08 ASSESSMENT — ANXIETY QUESTIONNAIRES
7. FEELING AFRAID AS IF SOMETHING AWFUL MIGHT HAPPEN: NOT AT ALL
7. FEELING AFRAID AS IF SOMETHING AWFUL MIGHT HAPPEN: NOT AT ALL
GAD7 TOTAL SCORE: 1
5. BEING SO RESTLESS THAT IT IS HARD TO SIT STILL: NOT AT ALL
3. WORRYING TOO MUCH ABOUT DIFFERENT THINGS: NOT AT ALL
GAD7 TOTAL SCORE: 1
1. FEELING NERVOUS, ANXIOUS, OR ON EDGE: NOT AT ALL
6. BECOMING EASILY ANNOYED OR IRRITABLE: NOT AT ALL
4. TROUBLE RELAXING: SEVERAL DAYS
GAD7 TOTAL SCORE: 1
2. NOT BEING ABLE TO STOP OR CONTROL WORRYING: NOT AT ALL

## 2022-03-08 ASSESSMENT — PATIENT HEALTH QUESTIONNAIRE - PHQ9
SUM OF ALL RESPONSES TO PHQ QUESTIONS 1-9: 4
10. IF YOU CHECKED OFF ANY PROBLEMS, HOW DIFFICULT HAVE THESE PROBLEMS MADE IT FOR YOU TO DO YOUR WORK, TAKE CARE OF THINGS AT HOME, OR GET ALONG WITH OTHER PEOPLE: SOMEWHAT DIFFICULT
SUM OF ALL RESPONSES TO PHQ QUESTIONS 1-9: 4

## 2022-03-08 ASSESSMENT — PAIN SCALES - GENERAL: PAINLEVEL: SEVERE PAIN (7)

## 2022-03-08 NOTE — NURSING NOTE
Patient is here for abdomin pain for about a month. Is not constant, but at times very intense, that it made him drop to his knees.     Medication Reconciliation: complete    FOOD SECURITY SCREENING QUESTIONS  Hunger Vital Signs:  Within the past 12 months we worried whether our food would run out before we got money to buy more. Sometimes  Within the past 12 months the food we bought just didn't last and we didn't have money to get more. Sometimes  Kamla Goodman LPN 3/8/2022 9:05 AM       Advance care directive on file? no  Advance care directive provided to patient? no       Kamla Goodman LPN

## 2022-03-08 NOTE — PROGRESS NOTES
"Assessment & Plan     Hypertriglyceridemia  Repeat lipid panel and CMP.  - Lipid Panel; Future  - Comprehensive Metabolic Panel; Future  - Comprehensive Metabolic Panel  - Lipid Panel    Abdominal pain, generalized  Unclear etiology.  Episodes of been brief and only occurred twice in the last month.  Most likely related to gas.  No other concerning symptoms.  Reassurance provided.  If symptoms persist or worsen, could consider imaging.    Diastasis recti  Reported on medical records.  Certainly can contribute           Tobacco Cessation:   reports that he has been smoking cigarettes. He has been smoking about 1.00 pack per day. He has never used smokeless tobacco.  Tobacco Cessation Action Plan: Self help information given to patient    BMI:   Estimated body mass index is 28.06 kg/m  as calculated from the following:    Height as of 10/3/21: 1.753 m (5' 9\").    Weight as of this encounter: 86.2 kg (190 lb).       See Patient Instructions    No follow-ups on file.     There are no Patient Instructions on file for this visit.      Elizabeth Flores MD  [facility]    Subjective     Ulises Greene is a 41 year old male who presents today for the following   health issues:  Nursing Notes:   Kamla Goodman LPN  3/8/2022  9:06 AM  Sign at exiting of workspace  Patient is here for abdomin pain for about a month. Is not constant, but at times very intense, that it made him drop to his knees.     Medication Reconciliation: complete    FOOD SECURITY SCREENING QUESTIONS  Hunger Vital Signs:  Within the past 12 months we worried whether our food would run out before we got money to buy more. Sometimes  Within the past 12 months the food we bought just didn't last and we didn't have money to get more. Sometimes  Kamla Goodman LPN 3/8/2022 9:05 AM       Advance care directive on file? no  Advance care directive provided to patient? no       Kamla Goodman LPN    40 yo male presents with a few " episodes of LUQ pain. Sharp, lasting 5 to 10 minutes.  He has had 2 episodes in the past month.  No associated nausea, vomiting, fever, chills or change in bowel movements.  Denies melena.  No relieving or aggravating factors    Sofya in 2014    Review of Systems  Constitutional, HEENT, cardiovascular, pulmonary, gi and gu systems are negative, except as otherwise noted.    Objective   /62   Pulse 92   Temp 98.6  F (37  C) (Tympanic)   Resp 18   Wt 86.2 kg (190 lb)   SpO2 98%   BMI 28.06 kg/m    Body mass index is 28.06 kg/m .    Physical Exam  GENERAL: healthy, alert and no distress  NECK: no adenopathy, no asymmetry, masses, or scars and thyroid normal to palpation  RESP: lungs clear to auscultation - no rales, rhonchi or wheezes  CV: regular rate and rhythm, normal S1 S2, no S3 or S4, no murmur, click or rub, no peripheral edema and peripheral pulses strong  ABDOMEN: soft, nontender, no hepatosplenomegaly, no masses and bowel sounds normal    Results for orders placed or performed in visit on 03/08/22   Comprehensive Metabolic Panel     Status: Normal   Result Value Ref Range    Sodium 138 134 - 144 mmol/L    Potassium 4.2 3.5 - 5.1 mmol/L    Chloride 105 98 - 107 mmol/L    Carbon Dioxide (CO2) 27 21 - 31 mmol/L    Anion Gap 6 3 - 14 mmol/L    Urea Nitrogen 11 7 - 25 mg/dL    Creatinine 1.05 0.70 - 1.30 mg/dL    Calcium 9.8 8.6 - 10.3 mg/dL    Glucose 90 70 - 105 mg/dL    Alkaline Phosphatase 67 34 - 104 U/L    AST 18 13 - 39 U/L    ALT 24 7 - 52 U/L    Protein Total 7.0 6.4 - 8.9 g/dL    Albumin 4.8 3.5 - 5.7 g/dL    Bilirubin Total 0.4 0.3 - 1.0 mg/dL    GFR Estimate 90 >60 mL/min/1.73m2   Lipid Panel     Status: Abnormal   Result Value Ref Range    Cholesterol 186 <200 mg/dL    Triglycerides 577 (H) <150 mg/dL    Direct Measure HDL 23 23 - 92 mg/dL    LDL Cholesterol Calculated      Non HDL Cholesterol 163 (H) <130 mg/dL    Patient Fasting > 8hrs? Unknown     Narrative    Cholesterol  Desirable:   <200 mg/dL    Triglycerides  Normal:  Less than 150 mg/dL  Borderline High:  150-199 mg/dL  High:  200-499 mg/dL  Very High:  Greater than or equal to 500 mg/dL    Direct Measure HDL  Female:  Greater than or equal to 50 mg/dL   Male:  Greater than or equal to 40 mg/dL    LDL Cholesterol  Desirable:  <100mg/dL  Above Desirable:  100-129 mg/dL   Borderline High:  130-159 mg/dL   High:  160-189 mg/dL   Very High:  >= 190 mg/dL    Non HDL Cholesterol  Desirable:  130 mg/dL  Above Desirable:  130-159 mg/dL  Borderline High:  160-189 mg/dL  High:  190-219 mg/dL  Very High:  Greater than or equal to 220 mg/dL                Answers for HPI/ROS submitted by the patient on 3/8/2022  If you checked off any problems, how difficult have these problems made it for you to do your work, take care of things at home, or get along with other people?: Somewhat difficult  PHQ9 TOTAL SCORE: 4  MARCIO 7 TOTAL SCORE: 1  How many servings of fruits and vegetables do you eat daily?: 0-1  On average, how many sweetened beverages do you drink each day (Examples: soda, juice, sweet tea, etc.  Do NOT count diet or artificially sweetened beverages)?: 3  How many minutes a day do you exercise enough to make your heart beat faster?: 9 or less  How many days a week do you exercise enough to make your heart beat faster?: 3 or less  How many days per week do you miss taking your medication?: 0  What is the reason for your visit today?: Have a sharp shooting pain in the upper left stomach when it is pushed just right  When did your symptoms begin?: More than a month  What are your symptoms?: Sharp shooting pain  How would you describe these symptoms?: Moderate  Are your symptoms:: Staying the same  Have you had these symptoms before?: No  Is there anything that makes you feel worse?: Pressure  Is there anything that makes you feel better?: Not moving

## 2022-03-09 ENCOUNTER — E-VISIT (OUTPATIENT)
Dept: FAMILY MEDICINE | Facility: OTHER | Age: 42
End: 2022-03-09
Attending: FAMILY MEDICINE
Payer: COMMERCIAL

## 2022-03-09 DIAGNOSIS — R10.84 ABDOMINAL PAIN, GENERALIZED: Primary | ICD-10-CM

## 2022-03-09 ASSESSMENT — ANXIETY QUESTIONNAIRES: GAD7 TOTAL SCORE: 1

## 2022-03-09 ASSESSMENT — PATIENT HEALTH QUESTIONNAIRE - PHQ9: SUM OF ALL RESPONSES TO PHQ QUESTIONS 1-9: 4

## 2022-03-11 ENCOUNTER — HOSPITAL ENCOUNTER (OUTPATIENT)
Dept: GENERAL RADIOLOGY | Facility: OTHER | Age: 42
Discharge: HOME | End: 2022-03-11
Attending: FAMILY MEDICINE
Payer: COMMERCIAL

## 2022-03-11 ENCOUNTER — OFFICE VISIT (OUTPATIENT)
Dept: FAMILY MEDICINE | Facility: OTHER | Age: 42
End: 2022-03-11
Attending: FAMILY MEDICINE
Payer: COMMERCIAL

## 2022-03-11 VITALS
OXYGEN SATURATION: 99 % | SYSTOLIC BLOOD PRESSURE: 138 MMHG | HEART RATE: 74 BPM | WEIGHT: 193.4 LBS | BODY MASS INDEX: 28.56 KG/M2 | DIASTOLIC BLOOD PRESSURE: 100 MMHG | RESPIRATION RATE: 18 BRPM | TEMPERATURE: 97.4 F

## 2022-03-11 DIAGNOSIS — R10.12 LEFT UPPER QUADRANT PAIN: Primary | ICD-10-CM

## 2022-03-11 DIAGNOSIS — R10.12 LEFT UPPER QUADRANT PAIN: ICD-10-CM

## 2022-03-11 LAB
ALBUMIN SERPL-MCNC: 4.8 G/DL (ref 3.5–5.7)
ALP SERPL-CCNC: 69 U/L (ref 34–104)
ALT SERPL W P-5'-P-CCNC: 30 U/L (ref 7–52)
ANION GAP SERPL CALCULATED.3IONS-SCNC: 9 MMOL/L (ref 3–14)
AST SERPL W P-5'-P-CCNC: 26 U/L (ref 13–39)
BASOPHILS # BLD AUTO: 0.1 10E3/UL (ref 0–0.2)
BASOPHILS NFR BLD AUTO: 1 %
BILIRUB SERPL-MCNC: 0.5 MG/DL (ref 0.3–1)
BUN SERPL-MCNC: 10 MG/DL (ref 7–25)
CALCIUM SERPL-MCNC: 9.9 MG/DL (ref 8.6–10.3)
CHLORIDE BLD-SCNC: 102 MMOL/L (ref 98–107)
CO2 SERPL-SCNC: 28 MMOL/L (ref 21–31)
CREAT SERPL-MCNC: 1.04 MG/DL (ref 0.7–1.3)
EOSINOPHIL # BLD AUTO: 0.1 10E3/UL (ref 0–0.7)
EOSINOPHIL NFR BLD AUTO: 2 %
ERYTHROCYTE [DISTWIDTH] IN BLOOD BY AUTOMATED COUNT: 12.1 % (ref 10–15)
GFR SERPL CREATININE-BSD FRML MDRD: >90 ML/MIN/1.73M2
GLUCOSE BLD-MCNC: 89 MG/DL (ref 70–105)
HCT VFR BLD AUTO: 46.6 % (ref 40–53)
HGB BLD-MCNC: 17.2 G/DL (ref 13.3–17.7)
IMM GRANULOCYTES # BLD: 0 10E3/UL
IMM GRANULOCYTES NFR BLD: 0 %
LIPASE SERPL-CCNC: 70 U/L (ref 11–82)
LYMPHOCYTES # BLD AUTO: 2.6 10E3/UL (ref 0.8–5.3)
LYMPHOCYTES NFR BLD AUTO: 31 %
MCH RBC QN AUTO: 32.9 PG (ref 26.5–33)
MCHC RBC AUTO-ENTMCNC: 36.9 G/DL (ref 31.5–36.5)
MCV RBC AUTO: 89 FL (ref 78–100)
MONOCYTES # BLD AUTO: 0.5 10E3/UL (ref 0–1.3)
MONOCYTES NFR BLD AUTO: 6 %
NEUTROPHILS # BLD AUTO: 5.2 10E3/UL (ref 1.6–8.3)
NEUTROPHILS NFR BLD AUTO: 60 %
NRBC # BLD AUTO: 0 10E3/UL
NRBC BLD AUTO-RTO: 0 /100
PLATELET # BLD AUTO: 222 10E3/UL (ref 150–450)
POTASSIUM BLD-SCNC: 4 MMOL/L (ref 3.5–5.1)
PROT SERPL-MCNC: 7.4 G/DL (ref 6.4–8.9)
RBC # BLD AUTO: 5.23 10E6/UL (ref 4.4–5.9)
SODIUM SERPL-SCNC: 139 MMOL/L (ref 134–144)
WBC # BLD AUTO: 8.5 10E3/UL (ref 4–11)

## 2022-03-11 PROCEDURE — 99214 OFFICE O/P EST MOD 30 MIN: CPT | Performed by: FAMILY MEDICINE

## 2022-03-11 PROCEDURE — 80053 COMPREHEN METABOLIC PANEL: CPT | Mod: ZL | Performed by: FAMILY MEDICINE

## 2022-03-11 PROCEDURE — 85025 COMPLETE CBC W/AUTO DIFF WBC: CPT | Mod: ZL | Performed by: FAMILY MEDICINE

## 2022-03-11 PROCEDURE — 36415 COLL VENOUS BLD VENIPUNCTURE: CPT | Mod: ZL | Performed by: FAMILY MEDICINE

## 2022-03-11 PROCEDURE — 83690 ASSAY OF LIPASE: CPT | Mod: ZL | Performed by: FAMILY MEDICINE

## 2022-03-11 PROCEDURE — 74019 RADEX ABDOMEN 2 VIEWS: CPT

## 2022-03-11 ASSESSMENT — ENCOUNTER SYMPTOMS
ANAL BLEEDING: 0
VOMITING: 0
ABDOMINAL PAIN: 1
CHILLS: 0
HEARTBURN: 0
DIARRHEA: 0
COUGH: 0
SHORTNESS OF BREATH: 0
FEVER: 0
ABDOMINAL DISTENTION: 0
HEMATOCHEZIA: 0

## 2022-03-11 ASSESSMENT — PAIN SCALES - GENERAL: PAINLEVEL: EXTREME PAIN (9)

## 2022-03-11 NOTE — PROGRESS NOTES
SUBJECTIVE:   Nursing Notes:   Mary Sofia LPN  3/11/2022  1:51 PM  Sign at exiting of workspace  Chief Complaint   Patient presents with     RECHECK     abd pain      Here today with complaints of worsening abd pain. Was seen on 3/8 for same pain. States that it is now constant and becoming more intense pain. No BM since Tuesday. Drinking well but not able to eat.     Medication Reconciliation: complete    Mary Sofia LPN        Ulises Greene is a 41 year old male who presents to clinic today for abdominal pain.  He has not had a bowel movement since 3/8/2022.   Symptoms have been going on for a month, but progressively getting worse.  Pain is in his left upper quadrant just to the left of midline.  No vomiting or nausea.  No diarrhea.  He hadn't had any constipation prior to 3/8/2022.  No other changes in his stool.  No blood or mucous in his stools.  No difficulty urinating.  He is still eating same as normal.  He is still drinking normally as well.  Eating does not make this worse.  Uncertain if having a bowel movement would make symptoms any worse at the time.  A couple of weeks ago when he was taking a comforter out of the dryer, had a lot of pain that brought him to his knees.  Pressing on that area causes a lot of pain.  At times feels like there is a lump in that area, other times not.  He has had a prior mandy, but no scars in that area.  No jaundice has been noted.  He had been snow blowing around his camper last night and had to chip out some hard snow from the snowblower at times.  His symptoms did seem to be worse after doing that.  His symptoms are worse with movement.    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   Past Surgical History:   Procedure Laterality Date     EXTRACTION(S) DENTAL       LAPAROSCOPIC CHOLECYSTECTOMY  06/26/2014     OPEN REDUCTION INTERNAL FIXATION CALCANEOUS Left 2008    Had 3 surgeries     Spinal cord stimulator  2012    Implanted and then removed about a year later     No family history on file.  Social History     Tobacco Use     Smoking status: Current Every Day Smoker     Packs/day: 1.00     Types: Cigarettes     Last attempt to quit: 4/2/2018     Years since quitting: 3.9     Smokeless tobacco: Never Used   Substance Use Topics     Alcohol use: Not Currently     Alcohol/week: 0.8 standard drinks     Comment: rare     Social History     Social History Narrative    , Cheryle.   One daughter, Summer,  (2002)    [employer]    Smoker 3/4 ppd     Current Outpatient Medications   Medication Sig Dispense Refill     acetaminophen-codeine (TYLENOL #3) 300-30 MG tablet Take 1 tablet by mouth every 6 hours as needed for severe pain       Buprenorphine HCl (BELBUCA) 300 MCG FILM buccal film Place 150 mcg inside cheek every 12 hours       LYRICA 200 MG capsule Take 1 capsule by mouth 3 times daily        tiZANidine (ZANAFLEX) 4 MG tablet TAKE 1 TABLET EVERY DAY BY ORAL ROUTE AT BEDTIME FOR 30 DAYS.       Allergies   Allergen Reactions     Gabapentin      Other reaction(s): Tremors     Methadone      Other reaction(s): Sleep Disturbances     Tramadol Other (See Comments)     tremors       Review of Systems   Constitutional: Negative for chills and fever.   HENT: Negative for congestion.     Respiratory: Negative for cough and shortness of breath.    Cardiovascular: Negative for peripheral edema.   Gastrointestinal: Positive for abdominal pain. Negative for abdominal distention, anal bleeding, diarrhea, heartburn, hematochezia and vomiting.        OBJECTIVE:     BP (!) 138/100 (BP Location: Right arm, Patient Position: Sitting, Cuff Size: Adult Regular)   Pulse 74   Temp 97.4  F (36.3  C) (Tympanic)   Resp 18   Wt 87.7 kg (193 lb 6.4 oz)   SpO2 99%   BMI 28.56 kg/m    Body mass index is 28.56 kg/m .  Physical Exam  Constitutional:       Appearance: Normal appearance.   HENT:      Head: Normocephalic.   Eyes:      Extraocular Movements: Extraocular movements intact.      Pupils: Pupils are equal, round, and reactive to light.   Cardiovascular:      Rate and Rhythm: Normal rate and regular rhythm.      Pulses: Normal pulses.      Heart sounds: Normal heart sounds. No murmur heard.  Pulmonary:      Effort: Pulmonary effort is normal.      Breath sounds: Normal breath sounds. No wheezing, rhonchi or rales.   Abdominal:      General: Abdomen is flat. Bowel sounds are normal. There is no distension.      Palpations: Abdomen is soft. There is no mass.      Tenderness: There is abdominal tenderness (point tender in left upper quadrant ). There is no guarding or rebound.      Hernia: No hernia is present.   Musculoskeletal:      Cervical back: Normal range of motion and neck supple.      Right lower leg: No edema.      Left lower leg: No edema.   Lymphadenopathy:      Cervical: No cervical adenopathy.   Skin:     General: Skin is warm and dry.      Coloration: Skin is not jaundiced.      Findings: No bruising or rash.   Neurological:      Mental Status: He is alert.   Psychiatric:         Mood and Affect: Mood normal.         Behavior: Behavior normal.           PHQ-9 SCORE 6/26/2018 7/28/2020 3/8/2022   PHQ-9 Total Score MyChart - - 4 (Minimal depression)   PHQ-9 Total Score 0 4 4       PHQ-2 Score:      PHQ-2 ( 1999 [employer]) 3/8/2022 12/29/2020   Q1: Little interest or pleasure in doing things 0 0   Q2: Feeling down, depressed or hopeless 0 0   PHQ-2 Score 0 0   PHQ-2 Total Score (12-17 Years)- Positive if 3 or more points; Administer PHQ-A if positive - 0   Q1: Little interest or pleasure in doing things Not at all -   Q2: Feeling down, depressed or hopeless Not at all -   PHQ-2 Score 0 -       MARCIO-7 SCORE 7/28/2020 3/8/2022   Total Score - 1 (minimal anxiety)   Total Score 1 1         No flowsheet data found.      I personally reviewed results withpatient as listed below:       Results for orders placed or performed during the hospital encounter of 03/11/22   XR Abdomen 2 Views     Status: None    Narrative    PROCEDURE: XR ABDOMEN 2VIEWS 3/11/2022 2:47 PM    HISTORY: Left upper quadrant pain    COMPARISONS: None.    TECHNIQUE: Flat and upright    FINDINGS: There is mild gaseous distention of small bowel loops in the  left upper quadrant with air-fluid levels. Gas is seen in normal  colonic loops. There is no extraluminal gas noted. There is no  extraluminal gas.         Impression    IMPRESSION: Mildly distended small bowel loops in the left upper  quadrant with air-fluid levels.    KEN REDD MD         SYSTEM ID:  [id number]   Results for orders placed or performed in visit on 03/11/22   Lipase     Status: Normal   Result Value Ref Range    Lipase 70 11 - 82 U/L   Comprehensive metabolic panel     Status: Normal   Result Value Ref Range    Sodium 139 134 - 144 mmol/L    Potassium 4.0 3.5 - 5.1 mmol/L    Chloride 102 98 - 107 mmol/L    Carbon Dioxide (CO2) 28 21 - 31 mmol/L    Anion Gap 9 3 - 14 mmol/L    Urea Nitrogen 10 7 - 25 mg/dL    Creatinine 1.04 0.70 - 1.30 mg/dL    Calcium 9.9 8.6 - 10.3 mg/dL    Glucose 89 70 - 105 mg/dL    Alkaline Phosphatase 69 34 - 104 U/L    AST 26 13 - 39 U/L    ALT 30 7 - 52 U/L    Protein Total 7.4 6.4 - 8.9 g/dL    Albumin 4.8 3.5 - 5.7 g/dL    Bilirubin Total 0.5 0.3  - 1.0 mg/dL    GFR Estimate >90 >60 mL/min/1.73m2   CBC with platelets and differential     Status: Abnormal   Result Value Ref Range    WBC Count 8.5 4.0 - 11.0 10e3/uL    RBC Count 5.23 4.40 - 5.90 10e6/uL    Hemoglobin 17.2 13.3 - 17.7 g/dL    Hematocrit 46.6 40.0 - 53.0 %    MCV 89 78 - 100 fL    MCH 32.9 26.5 - 33.0 pg    MCHC 36.9 (H) 31.5 - 36.5 g/dL    RDW 12.1 10.0 - 15.0 %    Platelet Count 222 150 - 450 10e3/uL    % Neutrophils 60 %    % Lymphocytes 31 %    % Monocytes 6 %    % Eosinophils 2 %    % Basophils 1 %    % Immature Granulocytes 0 %    NRBCs per 100 WBC 0 <1 /100    Absolute Neutrophils 5.2 1.6 - 8.3 10e3/uL    Absolute Lymphocytes 2.6 0.8 - 5.3 10e3/uL    Absolute Monocytes 0.5 0.0 - 1.3 10e3/uL    Absolute Eosinophils 0.1 0.0 - 0.7 10e3/uL    Absolute Basophils 0.1 0.0 - 0.2 10e3/uL    Absolute Immature Granulocytes 0.0 <=0.4 10e3/uL    Absolute NRBCs 0.0 10e3/uL   CBC and Differential     Status: Abnormal    Narrative    The following orders were created for panel order CBC and Differential.  Procedure                               Abnormality         Status                     ---------                               -----------         ------                     CBC with platelets and d...[[id number]]  Abnormal            Final result                 Please view results for these tests on the individual orders.        ASSESSMENT/PLAN:       ICD-10-CM    1. Left upper quadrant pain  R10.12 Comprehensive metabolic panel     CBC and Differential     Lipase     XR Abdomen 2 Views     Lipase     CBC and Differential     Comprehensive metabolic panel     CT Abdomen Pelvis w Contrast       1.  Discussed that his x-ray showed a few air fluid levels.  Discussed that this could be from constipation or possibly an evolving small bowel obstruction.  Labs were normal.  Given progressively worsening symptoms, discussed that he should have a CT for further evaluation, which is ordered.  Discussed that if  this is unrevealing, he should have a colonoscopy as a next step in evaluation.  Discussed trial of magnesium citrate and miralax to help mobilize stool.  Discussed that if his symptoms are getting progressively worse, developing vomiting or still not able to pass a bowel movement over the weekend, he should go directly to the Emergency Department to expedite work up.    Bethanie Hensley MD  [facility]

## 2022-03-11 NOTE — NURSING NOTE
Chief Complaint   Patient presents with     RECHECK     abd pain      Here today with complaints of worsening abd pain. Was seen on 3/8 for same pain. States that it is now constant and becoming more intense pain. No BM since Tuesday. Drinking well but not able to eat.     Medication Reconciliation: complete    Mary Sofia, LPN

## 2022-03-18 NOTE — PROGRESS NOTES
Patient was evaluated in clinic by another provider 3/11/22.   Kamla Goodman LPN .............3/18/2022     7:55 AM

## 2022-03-22 ENCOUNTER — HOSPITAL ENCOUNTER (OUTPATIENT)
Dept: CT IMAGING | Facility: OTHER | Age: 42
Discharge: HOME | End: 2022-03-22
Attending: FAMILY MEDICINE | Admitting: FAMILY MEDICINE
Payer: COMMERCIAL

## 2022-03-22 DIAGNOSIS — R10.84 ABDOMINAL PAIN, GENERALIZED: ICD-10-CM

## 2022-03-22 PROCEDURE — 74177 CT ABD & PELVIS W/CONTRAST: CPT

## 2022-03-22 PROCEDURE — 250N000011 HC RX IP 250 OP 636: Performed by: FAMILY MEDICINE

## 2022-03-22 RX ORDER — IOPAMIDOL 755 MG/ML
112 INJECTION, SOLUTION INTRAVASCULAR ONCE
Status: COMPLETED | OUTPATIENT
Start: 2022-03-22 | End: 2022-03-22

## 2022-03-22 RX ADMIN — IOPAMIDOL 112 ML: 755 INJECTION, SOLUTION INTRAVENOUS at 10:17

## 2022-03-22 NOTE — PROGRESS NOTES
1.  Has the patient had a previous reaction to IV contrast? no    2.  Does the patient have kidney disease? n    3.  Is the patient on dialysis? no    If YES to any of these questions, exam will be reviewed with a Radiologist before administering contrast.

## 2023-01-24 ENCOUNTER — HOSPITAL ENCOUNTER (OUTPATIENT)
Dept: GENERAL RADIOLOGY | Facility: OTHER | Age: 43
Discharge: HOME | End: 2023-01-24
Attending: FAMILY MEDICINE
Payer: COMMERCIAL

## 2023-01-24 ENCOUNTER — OFFICE VISIT (OUTPATIENT)
Dept: FAMILY MEDICINE | Facility: OTHER | Age: 43
End: 2023-01-24
Attending: FAMILY MEDICINE
Payer: COMMERCIAL

## 2023-01-24 VITALS
OXYGEN SATURATION: 98 % | TEMPERATURE: 97.6 F | RESPIRATION RATE: 16 BRPM | SYSTOLIC BLOOD PRESSURE: 116 MMHG | BODY MASS INDEX: 29 KG/M2 | HEART RATE: 69 BPM | WEIGHT: 195.8 LBS | HEIGHT: 69 IN | DIASTOLIC BLOOD PRESSURE: 70 MMHG

## 2023-01-24 DIAGNOSIS — M25.511 RIGHT ANTERIOR SHOULDER PAIN: ICD-10-CM

## 2023-01-24 DIAGNOSIS — M75.41 ROTATOR CUFF IMPINGEMENT SYNDROME OF RIGHT SHOULDER: ICD-10-CM

## 2023-01-24 DIAGNOSIS — S43.101A ACROMIOCLAVICULAR JOINT SEPARATION, RIGHT, INITIAL ENCOUNTER: ICD-10-CM

## 2023-01-24 DIAGNOSIS — M25.511 RIGHT ANTERIOR SHOULDER PAIN: Primary | ICD-10-CM

## 2023-01-24 PROCEDURE — 99213 OFFICE O/P EST LOW 20 MIN: CPT | Performed by: FAMILY MEDICINE

## 2023-01-24 PROCEDURE — 73030 X-RAY EXAM OF SHOULDER: CPT | Mod: RT

## 2023-01-24 RX ORDER — HYDROCODONE BITARTRATE AND ACETAMINOPHEN 5; 325 MG/1; MG/1
TABLET ORAL
COMMUNITY
Start: 2022-11-14 | End: 2024-02-22

## 2023-01-24 ASSESSMENT — ANXIETY QUESTIONNAIRES
5. BEING SO RESTLESS THAT IT IS HARD TO SIT STILL: NOT AT ALL
3. WORRYING TOO MUCH ABOUT DIFFERENT THINGS: NOT AT ALL
1. FEELING NERVOUS, ANXIOUS, OR ON EDGE: NOT AT ALL
GAD7 TOTAL SCORE: 0
7. FEELING AFRAID AS IF SOMETHING AWFUL MIGHT HAPPEN: NOT AT ALL
GAD7 TOTAL SCORE: 0
2. NOT BEING ABLE TO STOP OR CONTROL WORRYING: NOT AT ALL
8. IF YOU CHECKED OFF ANY PROBLEMS, HOW DIFFICULT HAVE THESE MADE IT FOR YOU TO DO YOUR WORK, TAKE CARE OF THINGS AT HOME, OR GET ALONG WITH OTHER PEOPLE?: SOMEWHAT DIFFICULT
4. TROUBLE RELAXING: NOT AT ALL
IF YOU CHECKED OFF ANY PROBLEMS ON THIS QUESTIONNAIRE, HOW DIFFICULT HAVE THESE PROBLEMS MADE IT FOR YOU TO DO YOUR WORK, TAKE CARE OF THINGS AT HOME, OR GET ALONG WITH OTHER PEOPLE: SOMEWHAT DIFFICULT
6. BECOMING EASILY ANNOYED OR IRRITABLE: NOT AT ALL
GAD7 TOTAL SCORE: 0
7. FEELING AFRAID AS IF SOMETHING AWFUL MIGHT HAPPEN: NOT AT ALL

## 2023-01-24 ASSESSMENT — PATIENT HEALTH QUESTIONNAIRE - PHQ9
10. IF YOU CHECKED OFF ANY PROBLEMS, HOW DIFFICULT HAVE THESE PROBLEMS MADE IT FOR YOU TO DO YOUR WORK, TAKE CARE OF THINGS AT HOME, OR GET ALONG WITH OTHER PEOPLE: SOMEWHAT DIFFICULT
SUM OF ALL RESPONSES TO PHQ QUESTIONS 1-9: 5
SUM OF ALL RESPONSES TO PHQ QUESTIONS 1-9: 5

## 2023-01-24 ASSESSMENT — PAIN SCALES - GENERAL: PAINLEVEL: EXTREME PAIN (8)

## 2023-01-24 NOTE — NURSING NOTE
Patient is here for right shoulder pain. Started about 5 years ago, but this year has been terrible.     Medication Reconciliation: complete    Kamla Goodman LPN 1/24/2023 10:25 AM       Advance care directive on file? no  Advance care directive provided to patient? no       Kamla Goodman LPN

## 2023-01-24 NOTE — PROGRESS NOTES
"  Assessment & Plan         Acromioclavicular joint separation, right, initial encounter  Reviewed x-ray with the patient.  Certainly could be impinging the rotator cuff.  Recommend proceeding with MRI given his significant decreased range of motion and functionality.  I will follow-up with orthopedics  - MR Shoulder Right w/o Contrast; Future  - Orthopedic  Referral; Future    Rotator cuff impingement syndrome of right shoulder    - MR Shoulder Right w/o Contrast; Future  - Orthopedic  Referral; Future       BMI:   Estimated body mass index is 28.91 kg/m  as calculated from the following:    Height as of this encounter: 1.753 m (5' 9\").    Weight as of this encounter: 88.8 kg (195 lb 12.8 oz).     No follow-ups on file.    Elizabeth Flores MD  [facility]    Marianela Montgomery is a 42 year old, presenting for the following health issues:  Shoulder Pain (Right )    43 yo male presents with 5 year history of right shoulder pain. Possible separation as a teen.  Pain is gradually getting worse.  He now has decreased range of motion as well to abduct his arm more than 90 degrees.  Decreased range of motion is having a significant impact on his ADLs.  Pain wakes him up at night.      Shoulder Pain    History of Present Illness       Reason for visit:  Shoulder has been bothering me and has limited use  Symptom onset:  More than a month  Symptoms include:  Dull pain all the time limited use  Symptom intensity:  Severe  Symptom progression:  Worsening  Had these symptoms before:  No  What makes it worse:  Moving it sleeping on it    He eats 0-1 servings of fruits and vegetables daily.He consumes 4 sweetened beverage(s) daily.He exercises with enough effort to increase his heart rate 9 or less minutes per day.  He exercises with enough effort to increase his heart rate 3 or less days per week.   He is taking medications regularly.    Today's PHQ-9         PHQ-9 Total Score: " "5    PHQ-9 Q9 Thoughts of better off dead/self-harm past 2 weeks :   Not at all    How difficult have these problems made it for you to do your work, take care of things at home, or get along with other people: Somewhat difficult  Today's MARCIO-7 Score: 0           Review of Systems   Denies neck elbow or hand pain.      Objective    /70   Pulse 69   Temp 97.6  F (36.4  C) (Tympanic)   Resp 16   Ht 1.753 m (5' 9\")   Wt 88.8 kg (195 lb 12.8 oz)   SpO2 98%   BMI 28.91 kg/m    Body mass index is 28.91 kg/m .  Physical Exam  Musculoskeletal:      Right shoulder: Tenderness present. No crepitus. Decreased range of motion. Normal pulse.      Comments: Positive anil storey hawkins            Results for orders placed or performed during the hospital encounter of 01/24/23   XR Shoulder Right G/E 3 Views     Status: None    Narrative    PROCEDURE:  XR SHOULDER RIGHT G/E 3 VIEWS    HISTORY: Right anterior shoulder pain.    COMPARISON:  None.    TECHNIQUE:  3 views right shoulder.    FINDINGS:  No fracture or dislocation is identified. Minimal glenoid  osteophytosis is suggested. There is mild AC joint hypertrophy. No  foreign body is seen.       Impression    IMPRESSION: Mild degenerative changes of the right shoulder.      LANE QUEVEDO MD         SYSTEM ID:  [id number]                   "

## 2023-02-09 ENCOUNTER — HOSPITAL ENCOUNTER (OUTPATIENT)
Dept: MRI IMAGING | Facility: OTHER | Age: 43
Discharge: HOME | End: 2023-02-09
Attending: NURSE PRACTITIONER
Payer: COMMERCIAL

## 2023-02-09 ENCOUNTER — HOSPITAL ENCOUNTER (OUTPATIENT)
Dept: MRI IMAGING | Facility: OTHER | Age: 43
Discharge: HOME | End: 2023-02-09
Attending: FAMILY MEDICINE
Payer: COMMERCIAL

## 2023-02-09 DIAGNOSIS — S43.101A ACROMIOCLAVICULAR JOINT SEPARATION, RIGHT, INITIAL ENCOUNTER: ICD-10-CM

## 2023-02-09 DIAGNOSIS — M51.17 INTERVERTEBRAL DISC DISORDER WITH RADICULOPATHY OF LUMBOSACRAL REGION: ICD-10-CM

## 2023-02-09 DIAGNOSIS — M25.511 RIGHT ANTERIOR SHOULDER PAIN: ICD-10-CM

## 2023-02-09 DIAGNOSIS — M75.41 ROTATOR CUFF IMPINGEMENT SYNDROME OF RIGHT SHOULDER: ICD-10-CM

## 2023-02-09 PROCEDURE — 72148 MRI LUMBAR SPINE W/O DYE: CPT

## 2023-02-09 PROCEDURE — 73221 MRI JOINT UPR EXTREM W/O DYE: CPT | Mod: RT

## 2023-04-14 ENCOUNTER — HOSPITAL ENCOUNTER (OUTPATIENT)
Dept: MRI IMAGING | Facility: OTHER | Age: 43
Discharge: HOME | End: 2023-04-14
Attending: NURSE PRACTITIONER | Admitting: NURSE PRACTITIONER
Payer: OTHER MISCELLANEOUS

## 2023-04-14 DIAGNOSIS — G90.50 COMPLEX REGIONAL PAIN SYNDROME TYPE I: ICD-10-CM

## 2023-04-14 PROCEDURE — 73718 MRI LOWER EXTREMITY W/O DYE: CPT | Mod: LT

## 2024-02-22 ENCOUNTER — OFFICE VISIT (OUTPATIENT)
Dept: FAMILY MEDICINE | Facility: OTHER | Age: 44
End: 2024-02-22
Attending: FAMILY MEDICINE
Payer: COMMERCIAL

## 2024-02-22 VITALS
RESPIRATION RATE: 18 BRPM | TEMPERATURE: 98.9 F | HEART RATE: 83 BPM | OXYGEN SATURATION: 96 % | HEIGHT: 69 IN | WEIGHT: 202 LBS | DIASTOLIC BLOOD PRESSURE: 76 MMHG | SYSTOLIC BLOOD PRESSURE: 126 MMHG | BODY MASS INDEX: 29.92 KG/M2

## 2024-02-22 DIAGNOSIS — L73.9 FOLLICULITIS: Primary | ICD-10-CM

## 2024-02-22 DIAGNOSIS — Z12.11 COLON CANCER SCREENING: ICD-10-CM

## 2024-02-22 DIAGNOSIS — E78.1 HYPERTRIGLYCERIDEMIA: ICD-10-CM

## 2024-02-22 PROCEDURE — 99213 OFFICE O/P EST LOW 20 MIN: CPT | Performed by: FAMILY MEDICINE

## 2024-02-22 RX ORDER — PREGABALIN 225 MG/1
CAPSULE ORAL
COMMUNITY
Start: 2024-02-19

## 2024-02-22 ASSESSMENT — PAIN SCALES - GENERAL: PAINLEVEL: SEVERE PAIN (6)

## 2024-02-22 NOTE — PROGRESS NOTES
"  Assessment & Plan     Folliculitis  Exam consistent with folliculitis.  Recommend warm packs.  Augmentin as ordered.  Reassurance provided that there is no palpable lymphadenopathy.  - amoxicillin-clavulanate (AUGMENTIN) 875-125 MG tablet; Take 1 tablet by mouth 2 times daily for 10 days    Colon cancer screening  Due for colon cancer screening.  Desires Cologuard.  - COLOGUARD([employer]); Future    Hypertriglyceridemia  Recommend repeat fasting labs as well as A1c.  - Lipid Panel; Future  - Hemoglobin A1c; Future            Nicotine/Tobacco Cessation  He reports that he has been smoking cigarettes. He has been smoking an average of 1 pack per day. He has been exposed to tobacco smoke. He has never used smokeless tobacco.  Nicotine/Tobacco Cessation Plan        BMI  Estimated body mass index is 29.83 kg/m  as calculated from the following:    Height as of this encounter: 1.753 m (5' 9\").    Weight as of this encounter: 91.6 kg (202 lb).       No follow-ups on file.    Marianela Montgomery is a 43 year old, presenting for the following health issues:  Mass    2 week history of swollen red bumps in both axilla.  He reports to get slightly bigger red and tender and then resolve    No drainage.    No recent fever chills cough or cold symptoms    History of Present Illness       Reason for visit:  Lumps in my arm pits and are painful  Symptom onset:  1-2 weeks ago    He eats 0-1 servings of fruits and vegetables daily.He consumes 4 sweetened beverage(s) daily.He exercises with enough effort to increase his heart rate 9 or less minutes per day.  He exercises with enough effort to increase his heart rate 3 or less days per week.   He is taking medications regularly.                 Review of Systems  Constitutional, HEENT, cardiovascular, pulmonary, gi and gu systems are negative, except as otherwise noted.      Objective    /76   Pulse 83   Temp 98.9  F (37.2  C) (Tympanic)   Resp 18   Ht 1.753 m (5' 9\")   " Wt 91.6 kg (202 lb)   SpO2 96%   BMI 29.83 kg/m    Body mass index is 29.83 kg/m .  Physical Exam  Chest:      Comments: 3 small red nodules in the right axilla that are less than 1 cm.  They are superficial, mildly tender.  There is no palpable lymphadenopathy in the neck supraclavicular or axillary lymph node chains.  Musculoskeletal:      Cervical back: No rigidity or tenderness.   Lymphadenopathy:      Cervical: No cervical adenopathy.   Neurological:      Mental Status: He is alert.                    Signed Electronically by: Elizabeth Flores MD

## 2024-02-22 NOTE — NURSING NOTE
Patient is here for concerns of lump in arm pits. Has pain, discoloration, and swelling. Has been 1-2 weeks.       Medication Reconciliation: complete    Kamla Goodman LPN 2/22/2024 11:51 AM       Advance care directive on file? no  Advance care directive provided to patient? no       Kamla Goodman LPN

## 2024-02-22 NOTE — COMMUNITY RESOURCES LIST (ENGLISH)
02/22/2024   [facility]  N/A  For questions about this resource list or additional care needs, please contact your primary care clinic or care manager.  Phone: [phone]   Email: N/A   Address: [address] [place] [id number]   Hours: N/A        Food and Nutrition       Food pantry  1  [facility] Distance: 6.99 miles      In-Person   [address] [place] [address]  Language: English  Hours: Mon - Thu 11:00 AM - 3:30 PM  Fees: Free   Phone: (882) 552-6092 Email: info@IceBreaker Website: https://IceBreaker     2  [facility] Shelf Distance: 18.2 miles      [facility]   [address] [place] [address]  Language: English  Hours: Thu 10:00 AM - 1:00 PM  Fees: Free   Phone: (225) 428-4172 Email: ozzy@[employer] Website: https://www.CHROM[employer]om.[employer]/HipscanRiverAreaFoodShelf/     SNAP application assistance  3  [facility] [employer] [facility] Distance: 6.11 miles      In-Person, Phone/Virtual   [address] [place] [address]  Language: English  Hours: Mon - Fri 8:00 AM - 4:30 PM  Fees: Free   Phone: (536) 634-8812 Website: https://www.[employer] DataSearchmn.[employer]/partner/ujqhnpche-swzdxluc-sjgndvwkwpp-[place]     4  [facility] Distance: 6.12 miles      [address] [place] [address]  Language: English  Hours: Mon - Fri 8:00 AM - 4:30 PM  Fees: Free   Phone: (322) 546-2758 Website: https://www.[place]./[facility]/Health-Human-Services          Important Numbers & Websites       Emergency Services   911  City Services   311  Poison Control   (901) 804-2310  Suicide Prevention Lifeline   (787) 972-1163 (TALK)  Child Abuse Hotline   (745) 310-9185 (4-A-Child)  Sexual Assault Hotline   (615) 174-1464 (HOPE)  National Runaway Safeline   (455) 311-6725 (RUNAWAY)  All-Options Talkline   (784) 931-3210  Substance Abuse Referral    (212) 565-6570 (HELP)

## 2024-03-22 ENCOUNTER — ORDERS ONLY (AUTO-RELEASED) (OUTPATIENT)
Dept: FAMILY MEDICINE | Facility: OTHER | Age: 44
End: 2024-03-22
Payer: COMMERCIAL

## 2024-03-22 DIAGNOSIS — Z12.11 COLON CANCER SCREENING: ICD-10-CM

## 2024-04-12 LAB — NONINV COLON CA DNA+OCC BLD SCRN STL QL: NEGATIVE

## 2024-04-15 ENCOUNTER — OFFICE VISIT (OUTPATIENT)
Dept: FAMILY MEDICINE | Facility: OTHER | Age: 44
End: 2024-04-15
Payer: COMMERCIAL

## 2024-04-15 VITALS
OXYGEN SATURATION: 97 % | RESPIRATION RATE: 16 BRPM | HEIGHT: 70 IN | DIASTOLIC BLOOD PRESSURE: 80 MMHG | BODY MASS INDEX: 29.22 KG/M2 | HEART RATE: 80 BPM | SYSTOLIC BLOOD PRESSURE: 124 MMHG | TEMPERATURE: 98.5 F | WEIGHT: 204.1 LBS

## 2024-04-15 DIAGNOSIS — T15.01XA CORNEAL FOREIGN BODY, RIGHT, INITIAL ENCOUNTER: Primary | ICD-10-CM

## 2024-04-15 DIAGNOSIS — H18.891 CORNEAL RUST RING OF RIGHT EYE: ICD-10-CM

## 2024-04-15 PROCEDURE — 99212 OFFICE O/P EST SF 10 MIN: CPT | Performed by: INTERNAL MEDICINE

## 2024-04-15 PROCEDURE — 250N000009 HC RX 250

## 2024-04-15 PROCEDURE — 250N000013 HC RX MED GY IP 250 OP 250 PS 637

## 2024-04-15 RX ORDER — BACITRACIN ZINC AND POLYMYXIN B SULFATE 500; 10000 [USP'U]/G; [USP'U]/G
OINTMENT OPHTHALMIC ONCE
Status: COMPLETED | OUTPATIENT
Start: 2024-04-15 | End: 2024-04-15

## 2024-04-15 RX ORDER — TETRACAINE HYDROCHLORIDE 5 MG/ML
2 SOLUTION OPHTHALMIC ONCE
Status: COMPLETED | OUTPATIENT
Start: 2024-04-15 | End: 2024-04-15

## 2024-04-15 RX ADMIN — TETRACAINE HYDROCHLORIDE 2 DROP: 5 SOLUTION OPHTHALMIC at 15:23

## 2024-04-15 RX ADMIN — BACITRACIN ZINC AND POLYMYXIN B SULFATE: 500; 10000 OINTMENT OPHTHALMIC at 15:21

## 2024-04-15 RX ADMIN — FLUORESCEIN SODIUM 1 STRIP: 1 STRIP OPHTHALMIC at 15:22

## 2024-04-15 ASSESSMENT — PAIN SCALES - GENERAL: PAINLEVEL: NO PAIN (0)

## 2024-04-15 NOTE — PROGRESS NOTES
"Assessment & Plan     ICD-10-CM    1. Corneal foreign body, right, initial encounter  T15.01XA tetracaine (PONTOCAINE) 0.5 % ophthalmic solution 2 drop     fluorescein (FUL-MIGUEL ANGEL) ophthalmic strip 1 strip     bacitracin-polymyxin b (POLYSPORIN) ophthalmic ointment      2. Corneal rust ring of right eye  H18.891         Patient reports that he was pounding on some metal on his sahra old truck and not sure if something scratched his eye or if he has some debris going to his eye.  Happened Saturday morning.  Was hoping it would go away but was not able to get the small particle out of his eye.  They did try a magnet at home which did not help.    Based on examination today, has small foreign body within the mid cornea, overlying the pupil.    Recommend follow-up with eye care provider.  Return as needed for new or worsening symptoms.    Return for follow-up as needed for new or worsening symptoms, Appointments: [phone].    Daren Felder MD  [facility]     SUBJECTIVE:  Ulises Greene 43 year old presents to clinic today for the following health issues:  Chief Complaint   Patient presents with    Eye Problem     Right Eye Issues x 3 Days         HPI           Procedures    Review of Systems    /80 (BP Location: Right arm, Patient Position: Chair, Cuff Size: Adult Large)   Pulse 80   Temp 98.5  F (36.9  C) (Temporal)   Resp 16   Ht 1.778 m (5' 10\")   Wt 92.6 kg (204 lb 1.6 oz)   SpO2 97%   BMI 29.29 kg/m      Physical Exam    Mid pupil - 1 mm corneal small metallic foreign body noted. 1 mm in diameter.       With verbal consent, patient elected to proceed with right eye - corneal foreign body removal.  Tetracaine was utilized for local anesthetic.  Fluorescein strip utilized with woods lamp.  No other eye injury or normalities noted externally.  Using 1 mm wanda, foreign body was removed without difficulty.  Small rust ring remained.  Polysporin ophthalmic ointment, 1 cm was applied to " the lower eyelid.

## 2024-04-15 NOTE — PATIENT INSTRUCTIONS
Corneal foreign body, right, initial encounter    Apply half-inch strip all along the lower eyelid twice daily for 7 days.  - bacitracin-polymyxin b (POLYSPORIN) ophthalmic ointment    Corneal rust ring of right eye  - possibly chronic residual from metal corneal foreign body. Majority of materials were removed today.       Can follow-up with your eye provider as needed.

## 2024-04-15 NOTE — NURSING NOTE
"Chief Complaint   Patient presents with    Eye Problem     Right Eye Issues x 3 Days        Initial /80 (BP Location: Right arm, Patient Position: Chair, Cuff Size: Adult Large)   Pulse 80   Temp 98.5  F (36.9  C) (Temporal)   Resp 16   Ht 1.778 m (5' 10\")   Wt 92.6 kg (204 lb 1.6 oz)   SpO2 97%   BMI 29.29 kg/m   Estimated body mass index is 29.29 kg/m  as calculated from the following:    Height as of this encounter: 1.778 m (5' 10\").    Weight as of this encounter: 92.6 kg (204 lb 1.6 oz).    FOOD SECURITY SCREENING QUESTIONS:    The next two questions are to help us understand your food security.  If you are feeling you need any assistance in this area, we have resources available to support you today.    Hunger Vital Signs:  Within the past 12 months we worried whether our food would run out before we got money to buy more. Never  Within the past 12 months the food we bought just didn't last and we didn't have money to get more. Never    Medication Reconciliation: Complete.       Sally Luis LPN on 4/15/2024 at 2:26 PM     "

## 2024-05-29 ENCOUNTER — APPOINTMENT (OUTPATIENT)
Dept: CT IMAGING | Facility: OTHER | Age: 44
End: 2024-05-29
Attending: PHYSICIAN ASSISTANT
Payer: COMMERCIAL

## 2024-05-29 ENCOUNTER — HOSPITAL ENCOUNTER (EMERGENCY)
Facility: OTHER | Age: 44
Discharge: HOME | End: 2024-05-29
Attending: PHYSICIAN ASSISTANT | Admitting: PHYSICIAN ASSISTANT
Payer: COMMERCIAL

## 2024-05-29 VITALS
TEMPERATURE: 97 F | HEART RATE: 69 BPM | BODY MASS INDEX: 29.27 KG/M2 | SYSTOLIC BLOOD PRESSURE: 116 MMHG | WEIGHT: 204 LBS | RESPIRATION RATE: 18 BRPM | OXYGEN SATURATION: 95 % | DIASTOLIC BLOOD PRESSURE: 81 MMHG

## 2024-05-29 DIAGNOSIS — R10.31 RIGHT LOWER QUADRANT ABDOMINAL PAIN: ICD-10-CM

## 2024-05-29 LAB
ALBUMIN SERPL BCG-MCNC: 4.7 G/DL (ref 3.5–5.2)
ALBUMIN UR-MCNC: NEGATIVE MG/DL
ALP SERPL-CCNC: 100 U/L (ref 40–150)
ALT SERPL W P-5'-P-CCNC: 48 U/L (ref 0–70)
ANION GAP SERPL CALCULATED.3IONS-SCNC: 14 MMOL/L (ref 7–15)
APPEARANCE UR: CLEAR
AST SERPL W P-5'-P-CCNC: 27 U/L (ref 0–45)
BACTERIA #/AREA URNS HPF: ABNORMAL /HPF
BASOPHILS # BLD AUTO: 0.1 10E3/UL (ref 0–0.2)
BASOPHILS NFR BLD AUTO: 1 %
BILIRUB DIRECT SERPL-MCNC: <0.2 MG/DL (ref 0–0.3)
BILIRUB SERPL-MCNC: 0.4 MG/DL
BILIRUB UR QL STRIP: NEGATIVE
BUN SERPL-MCNC: 8.6 MG/DL (ref 6–20)
CALCIUM SERPL-MCNC: 9.5 MG/DL (ref 8.6–10)
CHLORIDE SERPL-SCNC: 99 MMOL/L (ref 98–107)
COLOR UR AUTO: ABNORMAL
CREAT SERPL-MCNC: 1.08 MG/DL (ref 0.67–1.17)
DEPRECATED HCO3 PLAS-SCNC: 22 MMOL/L (ref 22–29)
EGFRCR SERPLBLD CKD-EPI 2021: 86 ML/MIN/1.73M2
EOSINOPHIL # BLD AUTO: 0.2 10E3/UL (ref 0–0.7)
EOSINOPHIL NFR BLD AUTO: 2 %
ERYTHROCYTE [DISTWIDTH] IN BLOOD BY AUTOMATED COUNT: 12.5 % (ref 10–15)
GLUCOSE SERPL-MCNC: 105 MG/DL (ref 70–99)
GLUCOSE UR STRIP-MCNC: NEGATIVE MG/DL
HCT VFR BLD AUTO: 46 % (ref 40–53)
HGB BLD-MCNC: 16.9 G/DL (ref 13.3–17.7)
HGB UR QL STRIP: NEGATIVE
HOLD SPECIMEN: NORMAL
HOLD SPECIMEN: NORMAL
IMM GRANULOCYTES # BLD: 0 10E3/UL
IMM GRANULOCYTES NFR BLD: 1 %
KETONES UR STRIP-MCNC: NEGATIVE MG/DL
LACTATE SERPL-SCNC: 2 MMOL/L (ref 0.7–2)
LEUKOCYTE ESTERASE UR QL STRIP: NEGATIVE
LIPASE SERPL-CCNC: 56 U/L (ref 13–60)
LYMPHOCYTES # BLD AUTO: 2.9 10E3/UL (ref 0.8–5.3)
LYMPHOCYTES NFR BLD AUTO: 34 %
MCH RBC QN AUTO: 34 PG (ref 26.5–33)
MCHC RBC AUTO-ENTMCNC: 36.7 G/DL (ref 31.5–36.5)
MCV RBC AUTO: 93 FL (ref 78–100)
MONOCYTES # BLD AUTO: 0.6 10E3/UL (ref 0–1.3)
MONOCYTES NFR BLD AUTO: 7 %
MUCOUS THREADS #/AREA URNS LPF: PRESENT /LPF
NEUTROPHILS # BLD AUTO: 4.8 10E3/UL (ref 1.6–8.3)
NEUTROPHILS NFR BLD AUTO: 56 %
NITRATE UR QL: NEGATIVE
NRBC # BLD AUTO: 0 10E3/UL
NRBC BLD AUTO-RTO: 0 /100
PH UR STRIP: 5 [PH] (ref 5–9)
PLATELET # BLD AUTO: 212 10E3/UL (ref 150–450)
POTASSIUM SERPL-SCNC: 3.7 MMOL/L (ref 3.4–5.3)
PROT SERPL-MCNC: 7.4 G/DL (ref 6.4–8.3)
RBC # BLD AUTO: 4.97 10E6/UL (ref 4.4–5.9)
RBC URINE: 0 /HPF
SODIUM SERPL-SCNC: 135 MMOL/L (ref 135–145)
SP GR UR STRIP: 1.01 (ref 1–1.03)
UROBILINOGEN UR STRIP-MCNC: NORMAL MG/DL
WBC # BLD AUTO: 8.6 10E3/UL (ref 4–11)
WBC URINE: 1 /HPF

## 2024-05-29 PROCEDURE — 250N000011 HC RX IP 250 OP 636: Performed by: PHYSICIAN ASSISTANT

## 2024-05-29 PROCEDURE — 74177 CT ABD & PELVIS W/CONTRAST: CPT

## 2024-05-29 PROCEDURE — 85025 COMPLETE CBC W/AUTO DIFF WBC: CPT | Performed by: PHYSICIAN ASSISTANT

## 2024-05-29 PROCEDURE — 96375 TX/PRO/DX INJ NEW DRUG ADDON: CPT

## 2024-05-29 PROCEDURE — 99284 EMERGENCY DEPT VISIT MOD MDM: CPT | Performed by: PHYSICIAN ASSISTANT

## 2024-05-29 PROCEDURE — 80053 COMPREHEN METABOLIC PANEL: CPT | Performed by: PHYSICIAN ASSISTANT

## 2024-05-29 PROCEDURE — 81001 URINALYSIS AUTO W/SCOPE: CPT | Performed by: PHYSICIAN ASSISTANT

## 2024-05-29 PROCEDURE — 83690 ASSAY OF LIPASE: CPT | Performed by: PHYSICIAN ASSISTANT

## 2024-05-29 PROCEDURE — 36415 COLL VENOUS BLD VENIPUNCTURE: CPT | Performed by: PHYSICIAN ASSISTANT

## 2024-05-29 PROCEDURE — 96374 THER/PROPH/DIAG INJ IV PUSH: CPT | Mod: XU

## 2024-05-29 PROCEDURE — 83605 ASSAY OF LACTIC ACID: CPT | Performed by: PHYSICIAN ASSISTANT

## 2024-05-29 PROCEDURE — 99285 EMERGENCY DEPT VISIT HI MDM: CPT | Mod: 25

## 2024-05-29 PROCEDURE — 82248 BILIRUBIN DIRECT: CPT | Performed by: PHYSICIAN ASSISTANT

## 2024-05-29 RX ORDER — FENTANYL CITRATE 50 UG/ML
50 INJECTION, SOLUTION INTRAMUSCULAR; INTRAVENOUS ONCE
Status: COMPLETED | OUTPATIENT
Start: 2024-05-29 | End: 2024-05-29

## 2024-05-29 RX ORDER — ONDANSETRON 2 MG/ML
4 INJECTION INTRAMUSCULAR; INTRAVENOUS ONCE
Status: DISCONTINUED | OUTPATIENT
Start: 2024-05-29 | End: 2024-05-29 | Stop reason: HOSPADM

## 2024-05-29 RX ORDER — KETOROLAC TROMETHAMINE 15 MG/ML
15 INJECTION, SOLUTION INTRAMUSCULAR; INTRAVENOUS ONCE
Status: COMPLETED | OUTPATIENT
Start: 2024-05-29 | End: 2024-05-29

## 2024-05-29 RX ORDER — IOPAMIDOL 755 MG/ML
118 INJECTION, SOLUTION INTRAVASCULAR ONCE
Status: COMPLETED | OUTPATIENT
Start: 2024-05-29 | End: 2024-05-29

## 2024-05-29 RX ADMIN — FENTANYL CITRATE 50 MCG: 50 INJECTION INTRAMUSCULAR; INTRAVENOUS at 20:13

## 2024-05-29 RX ADMIN — IOPAMIDOL 118 ML: 755 INJECTION, SOLUTION INTRAVENOUS at 19:51

## 2024-05-29 RX ADMIN — KETOROLAC TROMETHAMINE 15 MG: 15 INJECTION, SOLUTION INTRAMUSCULAR; INTRAVENOUS at 19:30

## 2024-05-29 ASSESSMENT — COLUMBIA-SUICIDE SEVERITY RATING SCALE - C-SSRS
2. HAVE YOU ACTUALLY HAD ANY THOUGHTS OF KILLING YOURSELF IN THE PAST MONTH?: NO
1. IN THE PAST MONTH, HAVE YOU WISHED YOU WERE DEAD OR WISHED YOU COULD GO TO SLEEP AND NOT WAKE UP?: NO
6. HAVE YOU EVER DONE ANYTHING, STARTED TO DO ANYTHING, OR PREPARED TO DO ANYTHING TO END YOUR LIFE?: NO

## 2024-05-29 ASSESSMENT — ACTIVITIES OF DAILY LIVING (ADL)
ADLS_ACUITY_SCORE: 35
ADLS_ACUITY_SCORE: 35
ADLS_ACUITY_SCORE: 33

## 2024-05-29 ASSESSMENT — ENCOUNTER SYMPTOMS
HEMATURIA: 0
SHORTNESS OF BREATH: 0
ABDOMINAL PAIN: 1
CHILLS: 0
COUGH: 0
NAUSEA: 0
FEVER: 0
VOMITING: 0

## 2024-05-29 NOTE — ED TRIAGE NOTES
Patient reports 8/10 RLQ pain. He states he woke up with the pain at 0430 had one episode of loose stool. Pain has been constant all day. Hx of gallbladder removal. vss     Triage Assessment (Adult)       Row Name 05/29/24 1841          Triage Assessment    Airway WDL WDL        Respiratory WDL    Respiratory WDL WDL        Skin Circulation/Temperature WDL    Skin Circulation/Temperature WDL WDL        Cardiac WDL    Cardiac WDL WDL        Peripheral/Neurovascular WDL    Peripheral Neurovascular WDL WDL        Cognitive/Neuro/Behavioral WDL    Cognitive/Neuro/Behavioral WDL WDL

## 2024-05-30 NOTE — DISCHARGE INSTRUCTIONS
Get plenty of fluids and rest.  As discussed, all of your lab work, vital signs and imaging appear excellent today.  We discussed that your presentation upon arrival was concerning for condition such as appendicitis.  Shared decision-making was utilized and was decided to continue with close monitoring as an outpatient.  You can alternate Tylenol and ibuprofen every 4 hours, use ice and heat.  I would expect if this is a benign condition that you should be gradually improving over the next 24 to 48 hours.  If you begin to have increased fevers, intractable pain, increased nausea or vomiting and you should return immediately to the ED for further evaluation.  Call and follow-up with PCP for reassessment as well.

## 2024-05-30 NOTE — ED PROVIDER NOTES
History     Chief Complaint   Patient presents with    Abdominal Pain     HPI  Ulises Greene is a 43 year old male who presents to the ED for evaluation of abdominal pain. Patient reports 8/10 RLQ pain. He states he woke up with the pain at 0430 had one episode of loose stool. Pain has been constant all day. Hx of gallbladder removal. vss     Allergies:  Allergies   Allergen Reactions    Gabapentin      Other reaction(s): Tremors    Methadone      Other reaction(s): Sleep Disturbances    Tramadol Other (See Comments)     tremors       Problem List:    Patient Active Problem List    Diagnosis Date Noted    Corneal rust ring of right eye 04/15/2024     Priority: Medium    Former smoker 2018     Priority: Medium    Complex regional pain syndrome type 2 of left lower extremity 10/24/2017     Priority: Medium    Left foot pain 2016     Priority: Medium    Onychomycosis 2016     Priority: Medium    Diastasis recti 08/10/2015     Priority: Medium    Hypertriglyceridemia 2014     Priority: Medium    Segmental dystonia 2011     Priority: Medium     Dystonia of left foot secondary to crush 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 No pertinent past medical history.    Past Surgical History:    Past Surgical History:   Procedure Laterality Date    EXTRACTION(S) DENTAL      LAPAROSCOPIC CHOLECYSTECTOMY  2014    OPEN REDUCTION INTERNAL FIXATION CALCANEOUS Left     Had 3 surgeries    Spinal cord stimulator      Implanted and then removed about a year later       Family History:    History reviewed. No pertinent family history.    Social History:  Marital Status:   [2]  Social History     Tobacco Use    Smoking status: Every Day     Current packs/day: 0.00     Types: Cigarettes     Last attempt to quit: 2018     Years since quittin.1     Passive exposure: Current     Smokeless tobacco: Never   Vaping Use    Vaping status: Never Used   Substance Use Topics    Alcohol use: Not Currently     Alcohol/week: 0.8 standard drinks of alcohol     Comment: rare    Drug use: Not Currently     Types: Marijuana     Comment: medical        Medications:    pregabalin (LYRICA) 225 MG capsule  tiZANidine (ZANAFLEX) 4 MG tablet          Review of Systems   Constitutional:  Negative for chills and fever.   HENT:  Negative for congestion.    Eyes:  Negative for visual disturbance.   Respiratory:  Negative for cough and shortness of breath.    Cardiovascular:  Negative for chest pain.   Gastrointestinal:  Positive for abdominal pain. Negative for nausea and vomiting.   Genitourinary:  Negative for hematuria.   Allergic/Immunologic: Negative for immunocompromised state.   Neurological:  Negative for syncope.       Physical Exam   BP: (!) 144/88  Pulse: 88  Temp: 97  F (36.1  C)  Resp: 18  Weight: 92.5 kg (204 lb)  SpO2: 97 %      Physical Exam  Constitutional:       General: He is not in acute distress.     Appearance: He is well-developed. He is not diaphoretic.   HENT:      Head: Normocephalic and atraumatic.   Eyes:      General: No scleral icterus.     Conjunctiva/sclera: Conjunctivae normal.   Neck:      Vascular: No carotid bruit.   Cardiovascular:      Rate and Rhythm: Normal rate and regular rhythm.   Pulmonary:      Effort: Pulmonary effort is normal.      Breath sounds: Normal breath sounds.   Abdominal:      Palpations: Abdomen is soft.      Tenderness: There is abdominal tenderness in the right lower quadrant.   Musculoskeletal:         General: No deformity.      Cervical back: Neck supple.      Right lower leg: No edema.      Left lower leg: No edema.   Skin:     General: Skin is warm and dry.      Coloration: Skin is not jaundiced.      Findings: No rash.   Neurological:      Mental Status: He is alert. Mental status is at baseline.   Psychiatric:         Mood and Affect: Mood  normal.         Behavior: Behavior normal.         ED Course        Procedures              Critical Care time:  none          Results for orders placed or performed during the hospital encounter of 05/29/24 (from the past 24 hour(s))   CBC with platelets differential    Narrative    The following orders were created for panel order CBC with platelets differential.  Procedure                               Abnormality         Status                     ---------                               -----------         ------                     CBC with platelets and d...[[id number]]  Abnormal            Final result                 Please view results for these tests on the individual orders.   Basic metabolic panel   Result Value Ref Range    Sodium 135 135 - 145 mmol/L    Potassium 3.7 3.4 - 5.3 mmol/L    Chloride 99 98 - 107 mmol/L    Carbon Dioxide (CO2) 22 22 - 29 mmol/L    Anion Gap 14 7 - 15 mmol/L    Urea Nitrogen 8.6 6.0 - 20.0 mg/dL    Creatinine 1.08 0.67 - 1.17 mg/dL    GFR Estimate 86 >60 mL/min/1.73m2    Calcium 9.5 8.6 - 10.0 mg/dL    Glucose 105 (H) 70 - 99 mg/dL   Hepatic function panel   Result Value Ref Range    Protein Total 7.4 6.4 - 8.3 g/dL    Albumin 4.7 3.5 - 5.2 g/dL    Bilirubin Total 0.4 <=1.2 mg/dL    Alkaline Phosphatase 100 40 - 150 U/L    AST 27 0 - 45 U/L    ALT 48 0 - 70 U/L    Bilirubin Direct <0.20 0.00 - 0.30 mg/dL   Lipase   Result Value Ref Range    Lipase 56 13 - 60 U/L   Lactic acid whole blood with 1x repeat in 2 hr when >2   Result Value Ref Range    Lactic Acid, Initial 2.0 0.7 - 2.0 mmol/L   UA with Microscopic reflex to Culture    Specimen: Urine, Clean Catch   Result Value Ref Range    Color Urine Light Yellow Colorless, Straw, Light Yellow, Yellow    Appearance Urine Clear Clear    Glucose Urine Negative Negative mg/dL    Bilirubin Urine Negative Negative    Ketones Urine Negative Negative mg/dL    Specific Gravity Urine 1.009 1.000 - 1.030    Blood Urine Negative Negative     pH Urine 5.0 5.0 - 9.0    Protein Albumin Urine Negative Negative mg/dL    Urobilinogen Urine Normal Normal, 2.0 mg/dL    Nitrite Urine Negative Negative    Leukocyte Esterase Urine Negative Negative    Bacteria Urine Few (A) None Seen /HPF    Mucus Urine Present (A) None Seen /LPF    RBC Urine 0 <=2 /HPF    WBC Urine 1 <=5 /HPF    Narrative    Urine Culture not indicated   CBC with platelets and differential   Result Value Ref Range    WBC Count 8.6 4.0 - 11.0 10e3/uL    RBC Count 4.97 4.40 - 5.90 10e6/uL    Hemoglobin 16.9 13.3 - 17.7 g/dL    Hematocrit 46.0 40.0 - 53.0 %    MCV 93 78 - 100 fL    MCH 34.0 (H) 26.5 - 33.0 pg    MCHC 36.7 (H) 31.5 - 36.5 g/dL    RDW 12.5 10.0 - 15.0 %    Platelet Count 212 150 - 450 10e3/uL    % Neutrophils 56 %    % Lymphocytes 34 %    % Monocytes 7 %    % Eosinophils 2 %    % Basophils 1 %    % Immature Granulocytes 1 %    NRBCs per 100 WBC 0 <1 /100    Absolute Neutrophils 4.8 1.6 - 8.3 10e3/uL    Absolute Lymphocytes 2.9 0.8 - 5.3 10e3/uL    Absolute Monocytes 0.6 0.0 - 1.3 10e3/uL    Absolute Eosinophils 0.2 0.0 - 0.7 10e3/uL    Absolute Basophils 0.1 0.0 - 0.2 10e3/uL    Absolute Immature Granulocytes 0.0 <=0.4 10e3/uL    Absolute NRBCs 0.0 10e3/uL   Extra Tube    Narrative    The following orders were created for panel order Extra Tube.  Procedure                               Abnormality         Status                     ---------                               -----------         ------                     Extra Blue Top Tube[[id number]]                              Final result               Extra Red Top Tube[[id number]]                               Final result                 Please view results for these tests on the individual orders.   Extra Blue Top Tube   Result Value Ref Range    Hold Specimen JIC    Extra Red Top Tube   Result Value Ref Range    Hold Specimen hold    CT Abdomen Pelvis w Contrast    Narrative    Exam: CT ABDOMEN PELVIS W CONTRAST    Exam reason:  RLQ ttp, postive McBurney's point, appendix? stone?    Technique: Using helical CT technique, axial images of the abdomen and  pelvis were obtained with IV contrast.  Coronal and sagittal  reconstructions also performed. This CT was performed using one or  more of the following dose reduction techniques: automated exposure  control, adjustment of the mA and/or kV according to patient size,  and/or use of iterative reconstruction technique.    Meds/Contrast: Isovue 370,  118 mL    Comparison: 3/22/2022     FINDINGS:    ABDOMEN:    Liver: No mass or any significant abnormality.  Gallbladder: Resected.   Bile Ducts: No biliary ductal dilation.   Spleen:  No splenomegaly or focal lesion.  Pancreas: No mass, ductal dilatation, or inflammatory changes.  Kidneys: No solid mass, hydronephrosis, or any definite calculi.   Adrenals:  Unchanged right adrenal nodule. No left adrenal nodule.   Lymph Nodes: No adenopathy.   Vascular: No aortic aneurysm.   Abdominal Wall: No acute findings.     PELVIS:   No mass or adenopathy.     Bowel/Mesentery/Peritoneum:   -No bowel obstruction.   -Normal appendix.  -No acute inflammatory findings.  -No ascites.    Visualized portions of the Chest: No consolidation or pleural  effusion.  Musculoskeletal: No acute osseous abnormalities.       Impression    IMPRESSION:  No acute findings in the abdomen or pelvis. Normal appendix.    JAIME MOULTON MD         SYSTEM ID:  [id number]       Medications   ondansetron (ZOFRAN) injection 4 mg (has no administration in time range)   ketorolac (TORADOL) injection 15 mg (15 mg Intravenous $Given 5/29/24 1930)   fentaNYL (PF) (SUBLIMAZE) injection 50 mcg (50 mcg Intravenous $Given 5/29/24 2013)   iopamidol (ISOVUE-370) solution 118 mL (118 mLs Intravenous $Given 5/29/24 1951)       Assessments & Plan (with Medical Decision Making)   Nontoxic but appears uncomfortable.  He does have right lower quadrant tenderness to palpation. VSS, afebrile.     He has  very well-appearing lab findings with no leukocytosis, normal hemoglobin, noninfectious appearing urinalysis.    CT abdomen:  No acute findings in the abdomen or pelvis. Normal appendix.     He is not having any testicle discomfort or swelling, no further testicular ultrasound is indicated at this time.    With our interventions in the ED patient reports feeling much improved.  We had a long discussion about his situation.  His pain is well-controlled, he is eating and drinking without difficulty and otherwise appears nontoxic.  I discussed with him that he has very reassuring vital signs and diagnostic studies this evening and while his original presentation was concerning for an infectious process such as appendicitis, currently, all of his studies look very well.  We discussed admission to the hospital for further observation, but, shared decision-making was utilized and he feels comfortable going home and continuing with conservative treatment and he is given very strict return precautions, as it is possible this condition could worsen.  He will follow-up with PCP as needed.  He understands and agrees with the plan the patient is discharged.    Mckinley Washington PA-C        I have reviewed the nursing notes.    I have reviewed the findings, diagnosis, plan and need for follow up with the patient.          Discharge Medication List as of 5/29/2024  8:54 PM          Final diagnoses:   Right lower quadrant abdominal pain       5/29/2024   [facility]       Mckinley Washington PA  05/29/24 2126

## 2025-02-17 ENCOUNTER — THERAPY VISIT (OUTPATIENT)
Dept: PHYSICAL THERAPY | Facility: OTHER | Age: 45
End: 2025-02-17
Attending: NURSE PRACTITIONER
Payer: COMMERCIAL

## 2025-02-17 DIAGNOSIS — T14.8XXA MUSCULOSKELETAL STRAIN: ICD-10-CM

## 2025-02-17 DIAGNOSIS — M54.6 ACUTE RIGHT-SIDED THORACIC BACK PAIN: ICD-10-CM

## 2025-02-17 PROCEDURE — 97110 THERAPEUTIC EXERCISES: CPT | Mod: GP

## 2025-02-17 PROCEDURE — 97112 NEUROMUSCULAR REEDUCATION: CPT | Mod: GP

## 2025-02-17 PROCEDURE — 97161 PT EVAL LOW COMPLEX 20 MIN: CPT | Mod: GP

## 2025-02-17 NOTE — PROGRESS NOTES
PHYSICAL THERAPY EVALUATION  Type of Visit: Evaluation       Fall Risk Screen:  Fall screen completed by: PT  Have you fallen 2 or more times in the past year?: Yes  Have you fallen and had an injury in the past year?: No  Is patient a fall risk?: No    Subjective         Presenting condition or subjective complaint: Presenting with upper back/shoulder blade pain of the R side, troubles with loss of movement, burning, began about a month ago picking up his suitcase while traveling and flared symptoms, this has happened previously but the pain will not resolve currently. Pt describes pain as a nerve pinch, up to 10/10 pain, sharp, dull, burning, and stabbing that occurs all the time. Worsens with lifting, carrying, and improves with rest. Pt is employed but is layed off for the winter. Pt has had multiple L foot surgeries in the past, describes arthritic changes and is a smoker. Pt does note he has had roughly 6 falls in the last year as a result of ongoing L foot pain. Pt does note that he is not completely limited by his pain but it does make everything more difficult. Noticed that snowmobiling over the weekend was pretty irritable.  Date of onset: 01/17/25      Prior diagnostic imaging/testing results:     None  Prior therapy history for the same diagnosis, illness or injury:    Has had the same flare ups in the past, denies tx for any of these symptoms in the past.     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     Patient goals for therapy:      Pain assessment: Location: R upper back/scapualr region /Rating: 10/10  per patient report     Objective   Cervical AROM  FF WFL, Ext 75%, R rot 70 deg, L rot 50 deg, R SB WFL, L SB 10 deg   Thoracic Ext to neutral, all others WFL  R GHJ flex 160, Abd 90, IR 70, ER 90    Strength   R scapular complex  4-/5 and painful sig weakness in rhomboids, pec min  L sided 5/5 throughout     Special Test   - Cervical cluster  - ULTT Nerve tension  + CTJ Dysfunction  + R First rib elevation and tenderness   + R scapular winging with resistance     Observation  High guard R UE   Thoracic stiffness noted and kyphosis does not correct in upright posture    Palpation  Sig ttp R MT, rhomboids, UT, levator, First rib R side, pec min sig ttp R       Assessment & Plan   CLINICAL IMPRESSIONS  Medical Diagnosis: M54.6, T14.8XXA    Treatment Diagnosis: Limited ROM, weakness, intolerance to overhead lifting, scapular dysfunction, elevated and tender R first rib   Impression/Assessment: Patient is a 44 year old male with upper back/shoulder pain complaints.  The following significant findings have been identified: Pain, Decreased ROM/flexibility, Decreased joint mobility, Decreased strength, Decreased proprioception, Impaired muscle performance, Decreased activity tolerance, and Impaired posture. These impairments interfere with their ability to perform self care tasks, work tasks, recreational activities, household chores, driving , household mobility, and community mobility as compared to previous level of function.     Clinical Decision Making (Complexity):  Clinical Presentation: Stable/Uncomplicated  Clinical Presentation Rationale: based on medical and personal factors listed in PT evaluation  Clinical Decision Making (Complexity): Low complexity    PLAN OF CARE  Treatment Interventions:  Modalities: Biofeedback, Contrast Bath, Cryotherapy, Cupping, Dry Needling, Fluidotherapy, E-stim, Hot Pack, Infrared, Iontophoresis, Mechanical Traction, Ultrasound  Interventions: Gait Training, Manual Therapy, Neuromuscular Re-education, Therapeutic Activity, Therapeutic Exercise, Self-Care/Home Management, Aquatic Therapy    Long Term Goals     PT Goal 1  Goal Identifier: ROM  Goal Description: Pt will be able to complete all GHJ and thoracic  ROM without pain to improve tolerance to dynamic load.  Rationale: to maximize safety and independence with performance of ADLs and functional tasks;to maximize safety and independence within the community;to maximize safety and independence with self cares  Target Date: 03/31/25  PT Goal 2  Goal Identifier: ADLs  Goal Description: Pt will be able to complete all ADLs and functional lifting tasks without pain or compensation to improve QoL  Rationale: to maximize safety and independence with performance of ADLs and functional tasks;to maximize safety and independence within the community;to maximize safety and independence with self cares  Target Date: 03/31/25  PT Goal 3  Goal Identifier: HEP  Goal Description: Pt will be able to perform and tolerate all progressions of HEP without pain to improve long term outcome  Rationale: to maximize safety and independence with performance of ADLs and functional tasks  Target Date: 03/31/25      Frequency of Treatment: 1-2x per week  Duration of Treatment: up to 6 weeks    Recommended Referrals to Other Professionals:  None  Education Assessment:   Learner/Method: Patient;Listening;Reading;Demonstration;Pictures/Video  Education Comments: HEP    Risks and benefits of evaluation/treatment have been explained.   Patient/Family/caregiver agrees with Plan of Care.     Evaluation Time:     PT Eval, Low Complexity Minutes (90255): 15     Signing Clinician: Selwyn Chung PT

## 2025-02-19 ENCOUNTER — THERAPY VISIT (OUTPATIENT)
Dept: PHYSICAL THERAPY | Facility: OTHER | Age: 45
End: 2025-02-19
Attending: NURSE PRACTITIONER
Payer: COMMERCIAL

## 2025-02-19 DIAGNOSIS — T14.8XXA MUSCULOSKELETAL STRAIN: ICD-10-CM

## 2025-02-19 DIAGNOSIS — M54.6 ACUTE RIGHT-SIDED THORACIC BACK PAIN: Primary | ICD-10-CM

## 2025-02-19 PROCEDURE — 97112 NEUROMUSCULAR REEDUCATION: CPT | Mod: GP

## 2025-02-19 PROCEDURE — 97140 MANUAL THERAPY 1/> REGIONS: CPT | Mod: GP

## 2025-02-19 PROCEDURE — 97110 THERAPEUTIC EXERCISES: CPT | Mod: GP

## 2025-02-24 ENCOUNTER — THERAPY VISIT (OUTPATIENT)
Dept: PHYSICAL THERAPY | Facility: OTHER | Age: 45
End: 2025-02-24
Attending: NURSE PRACTITIONER
Payer: COMMERCIAL

## 2025-02-24 DIAGNOSIS — T14.8XXA MUSCULOSKELETAL STRAIN: ICD-10-CM

## 2025-02-24 DIAGNOSIS — M54.6 ACUTE RIGHT-SIDED THORACIC BACK PAIN: Primary | ICD-10-CM

## 2025-02-24 PROCEDURE — 97110 THERAPEUTIC EXERCISES: CPT | Mod: GP

## 2025-02-24 PROCEDURE — 97140 MANUAL THERAPY 1/> REGIONS: CPT | Mod: GP

## 2025-02-24 PROCEDURE — 97112 NEUROMUSCULAR REEDUCATION: CPT | Mod: GP

## 2025-02-27 ENCOUNTER — THERAPY VISIT (OUTPATIENT)
Dept: PHYSICAL THERAPY | Facility: OTHER | Age: 45
End: 2025-02-27
Attending: NURSE PRACTITIONER
Payer: COMMERCIAL

## 2025-02-27 DIAGNOSIS — T14.8XXA MUSCULOSKELETAL STRAIN: ICD-10-CM

## 2025-02-27 DIAGNOSIS — M54.6 ACUTE RIGHT-SIDED THORACIC BACK PAIN: Primary | ICD-10-CM

## 2025-02-27 PROCEDURE — 97140 MANUAL THERAPY 1/> REGIONS: CPT | Mod: GP

## 2025-02-27 PROCEDURE — 97112 NEUROMUSCULAR REEDUCATION: CPT | Mod: GP

## 2025-02-27 PROCEDURE — 97110 THERAPEUTIC EXERCISES: CPT | Mod: GP

## 2025-03-03 ENCOUNTER — THERAPY VISIT (OUTPATIENT)
Dept: PHYSICAL THERAPY | Facility: OTHER | Age: 45
End: 2025-03-03
Attending: NURSE PRACTITIONER
Payer: COMMERCIAL

## 2025-03-03 DIAGNOSIS — T14.8XXA MUSCULOSKELETAL STRAIN: ICD-10-CM

## 2025-03-03 DIAGNOSIS — M54.6 ACUTE RIGHT-SIDED THORACIC BACK PAIN: Primary | ICD-10-CM

## 2025-03-03 PROCEDURE — 97110 THERAPEUTIC EXERCISES: CPT | Mod: GP

## 2025-03-03 PROCEDURE — 97140 MANUAL THERAPY 1/> REGIONS: CPT | Mod: GP

## 2025-03-03 PROCEDURE — 97112 NEUROMUSCULAR REEDUCATION: CPT | Mod: GP

## 2025-03-05 ENCOUNTER — THERAPY VISIT (OUTPATIENT)
Dept: PHYSICAL THERAPY | Facility: OTHER | Age: 45
End: 2025-03-05
Attending: NURSE PRACTITIONER
Payer: COMMERCIAL

## 2025-03-05 DIAGNOSIS — T14.8XXA MUSCULOSKELETAL STRAIN: ICD-10-CM

## 2025-03-05 DIAGNOSIS — M54.6 ACUTE RIGHT-SIDED THORACIC BACK PAIN: Primary | ICD-10-CM

## 2025-03-05 PROCEDURE — 97140 MANUAL THERAPY 1/> REGIONS: CPT | Mod: GP

## 2025-03-05 PROCEDURE — 97110 THERAPEUTIC EXERCISES: CPT | Mod: GP

## (undated) RX ORDER — SODIUM CHLORIDE 9 MG/ML
INJECTION, SOLUTION INTRAVENOUS
Status: DISPENSED
Start: 2020-06-02

## (undated) RX ORDER — ASPIRIN 81 MG/1
TABLET, CHEWABLE ORAL
Status: DISPENSED
Start: 2020-06-02

## (undated) RX ORDER — KETOROLAC TROMETHAMINE 15 MG/ML
INJECTION, SOLUTION INTRAMUSCULAR; INTRAVENOUS
Status: DISPENSED
Start: 2024-05-29

## (undated) RX ORDER — FENTANYL CITRATE 50 UG/ML
INJECTION, SOLUTION INTRAMUSCULAR; INTRAVENOUS
Status: DISPENSED
Start: 2024-05-29

## (undated) RX ORDER — TETRACAINE HYDROCHLORIDE 5 MG/ML
SOLUTION OPHTHALMIC
Status: DISPENSED
Start: 2024-04-15

## (undated) RX ORDER — SODIUM CHLORIDE FOR INHALATION 0.9 %
VIAL, NEBULIZER (ML) INHALATION
Status: DISPENSED
Start: 2024-04-15

## (undated) RX ORDER — DIAZEPAM 5 MG
TABLET ORAL
Status: DISPENSED
Start: 2018-06-14

## (undated) RX ORDER — GINSENG 100 MG
CAPSULE ORAL
Status: DISPENSED
Start: 2018-07-13

## (undated) RX ORDER — NITROGLYCERIN 0.4 MG/1
TABLET SUBLINGUAL
Status: DISPENSED
Start: 2020-06-02

## (undated) RX ORDER — BACITRACIN ZINC AND POLYMYXIN B SULFATE 500; 10000 [USP'U]/G; [USP'U]/G
OINTMENT OPHTHALMIC
Status: DISPENSED
Start: 2024-04-15